# Patient Record
Sex: MALE | Race: WHITE | NOT HISPANIC OR LATINO | Employment: OTHER | URBAN - METROPOLITAN AREA
[De-identification: names, ages, dates, MRNs, and addresses within clinical notes are randomized per-mention and may not be internally consistent; named-entity substitution may affect disease eponyms.]

---

## 2017-04-11 ENCOUNTER — GENERIC CONVERSION - ENCOUNTER (OUTPATIENT)
Dept: OTHER | Facility: OTHER | Age: 52
End: 2017-04-11

## 2017-04-24 ENCOUNTER — GENERIC CONVERSION - ENCOUNTER (OUTPATIENT)
Dept: OTHER | Facility: OTHER | Age: 52
End: 2017-04-24

## 2017-05-05 ENCOUNTER — GENERIC CONVERSION - ENCOUNTER (OUTPATIENT)
Dept: OTHER | Facility: OTHER | Age: 52
End: 2017-05-05

## 2017-06-16 ENCOUNTER — ALLSCRIPTS OFFICE VISIT (OUTPATIENT)
Dept: OTHER | Facility: OTHER | Age: 52
End: 2017-06-16

## 2017-06-20 ENCOUNTER — GENERIC CONVERSION - ENCOUNTER (OUTPATIENT)
Dept: OTHER | Facility: OTHER | Age: 52
End: 2017-06-20

## 2017-07-14 ENCOUNTER — ALLSCRIPTS OFFICE VISIT (OUTPATIENT)
Dept: OTHER | Facility: OTHER | Age: 52
End: 2017-07-14

## 2017-10-11 ENCOUNTER — GENERIC CONVERSION - ENCOUNTER (OUTPATIENT)
Dept: OTHER | Facility: OTHER | Age: 52
End: 2017-10-11

## 2017-10-23 ENCOUNTER — ALLSCRIPTS OFFICE VISIT (OUTPATIENT)
Dept: OTHER | Facility: OTHER | Age: 52
End: 2017-10-23

## 2017-10-27 ENCOUNTER — GENERIC CONVERSION - ENCOUNTER (OUTPATIENT)
Dept: OTHER | Facility: OTHER | Age: 52
End: 2017-10-27

## 2017-12-01 DIAGNOSIS — D64.9 ANEMIA: ICD-10-CM

## 2017-12-01 DIAGNOSIS — E78.5 HYPERLIPIDEMIA: ICD-10-CM

## 2017-12-01 DIAGNOSIS — N40.0 ENLARGED PROSTATE WITHOUT LOWER URINARY TRACT SYMPTOMS (LUTS): ICD-10-CM

## 2017-12-01 DIAGNOSIS — Z12.5 ENCOUNTER FOR SCREENING FOR MALIGNANT NEOPLASM OF PROSTATE: ICD-10-CM

## 2017-12-01 DIAGNOSIS — E83.119 HEMOCHROMATOSIS: ICD-10-CM

## 2017-12-01 DIAGNOSIS — R53.83 OTHER FATIGUE: ICD-10-CM

## 2017-12-01 DIAGNOSIS — E11.9 TYPE 2 DIABETES MELLITUS WITHOUT COMPLICATIONS (HCC): ICD-10-CM

## 2017-12-18 ENCOUNTER — ALLSCRIPTS OFFICE VISIT (OUTPATIENT)
Dept: OTHER | Facility: OTHER | Age: 52
End: 2017-12-18

## 2017-12-19 ENCOUNTER — GENERIC CONVERSION - ENCOUNTER (OUTPATIENT)
Dept: OTHER | Facility: OTHER | Age: 52
End: 2017-12-19

## 2017-12-19 LAB
LEFT EYE DIABETIC RETINOPATHY: NORMAL
RIGHT EYE DIABETIC RETINOPATHY: NORMAL

## 2017-12-19 NOTE — PROGRESS NOTES
Assessment  1  Benign hypertension with CKD (chronic kidney disease) stage III (403 10,585 3) (I12 9,N18 3)   2  CKD (chronic kidney disease) stage 3, GFR 30-59 ml/min (585 3) (N18 3)   3  Hyperlipidemia (272 4) (E78 5)   4  Type 2 diabetes mellitus (250 00) (E11 9)    Plan  Anemia, Benign hypertension with CKD (chronic kidney disease) stage III, CKD (chronickidney disease) stage 3, GFR 30-59 ml/min, Hyperlipidemia, Type 2 diabetes mellitus    · (1) CBC/PLT/DIFF; Status:Active - Retrospective By Protocol Authorization; Requestedfor:04Jun2018;    · (1) TSH; Status:Active - Retrospective By Protocol Authorization; Requestedfor:04Jun2018; Benign hypertension with CKD (chronic kidney disease) stage III, CKD (chronic kidneydisease) stage 3, GFR 30-59 ml/min, Hyperlipidemia, Type 2 diabetes mellitus    · (1) COMPREHENSIVE METABOLIC PANEL; Status:Active - Retrospective By ProtocolAuthorization; Requested EMX:[id number];    · (1) HEMOGLOBIN A1C; Status:Active - Retrospective By Protocol Authorization; Requested SJJ:[id number];    · (1) LIPID PANEL, FASTING; Status:Active - Retrospective By Protocol Authorization; Requested OGI:[id number];    · (1) MICROALBUMIN CREATININE RATIO, RANDOM URINE; Status:Active - RetrospectiveBy Protocol Authorization; Requested FXI:[id number]; Discussion/Summary    Patient was seen in office today for follow-up of hypertension, diabetes, hyperlipidemia, CKD  Lab work was reviewed with the patient as well  1  HTN: Stable continue with current medication2  CKD: Stable, patient follows with Nephrology  Per Irvin Garcia there are no changes in the plan at this time  3  Hyperlipidemia:most recent lipid panel stable  Patient to continue atorvastatin 20 mg 4  DM: Controlled, most recent A1c decreased to 6 5%  Patient to continue with current medicationPatient is follow up in 6 months at that time will get urine microalbumin creatinine ratio, CMP, CBC, TSH, A1c, lipid panel     The patient was counseled regarding diagnostic results,-- instructions for management,-- risks and benefits of treatment options,-- importance of compliance with treatment  total time of encounter was 30 minutes-- and-- >50% minutes was spent counseling  Chief Complaint  F/U DM and cholesterol  ksd,cma      History of Present Illness  The patient is here today for a follow-up visit  His diabetes mellitus type 2 is stable  the patient is adherent with his medication regimen  -- He denies medication side effects  (Doesn't check sugar at home)  His hyperlipidemia has been stable  the patient is adherent with his medication regimen  -- He denies medication side effects  Symptoms: denies dyspnea,-- denies lower extremity edema,-- denies numbness of the feet,-- denies foot pain,-- denies a foot ulcer,-- denies muscle pain-- and-- denies muscle weakness--   The patient presents with complaints of mild sub-xiphoid new onset of chest pain, described as sharp (Patient had period of chest pain last week for one day on and off  No SOB or sweating associated with it  Patient said it wasn't reproducible  He went to the gym that day  It completely resolved and has not returned since  Patient educated to call the office if this does happen again  )  Associated symptoms include heartburn, but-- no palpitations-- and-- no syncope  Lifestyle and Disease Management: Diet: He consumes a diverse and healthy diet  Exercise: He exercises regularly  -- 4 x week  Smoking: He does not use tobacco  Alcohol: He denies alcohol use  -- rare  Goals: the patient is doing well with his goals  Patient presents for follow up of HL and DM and to review labs  Review of Systems   Constitutional: no fever-- and-- no chills  Eyes: no eyesight problems  Cardiovascular: chest pain-- and-- as stated above, but-- no palpitations-- and-- no extremity edema  Respiratory: no shortness of breath-- and-- no shortness of breath during exertion    Gastrointestinal: no nausea,-- no vomiting-- and-- no diarrhea  Genitourinary: no dysuria-- and-- no incontinence  Musculoskeletal: no myalgias  Integumentary: no rashes  Neurological: no headache,-- no numbness,-- no dizziness-- and-- no fainting  Active Problems  1  Anemia (285 9) (D64 9)   2  Benign essential hypertension (401 1) (I10)   3  Benign hypertension with CKD (chronic kidney disease) stage III (403 10,585 3) (I12 9,N18 3)   4  Benign prostate hyperplasia (600 00) (N40 0)   5  CKD (chronic kidney disease) stage 3, GFR 30-59 ml/min (585 3) (N18 3)   6  Encounter for prostate cancer screening (V76 44) (Z12 5)   7  Encounter for screening colonoscopy (V76 51) (Z12 11)   8  Fatigue (780 79) (R53 83)   9  Flu vaccine need (V04 81) (Z23)   10  Gastroesophageal reflux disease without esophagitis (530 81) (K21 9)   11  Hemochromatosis (275 03) (E83 119)   12  History of esophageal reflux (V12 79) (Z87 19)   13  Hyperlipidemia (272 4) (E78 5)   14  Need for influenza vaccination (V04 81) (Z23)   15  Pinguecula (372 51)   16  Tremor (781 0) (R25 1)   17  Type 2 diabetes mellitus (250 00) (E11 9)    Past Medical History  1  History of Acute diarrhea (787 91) (R19 7)   2  History of Acute kidney injury (nontraumatic) (584 9) (N17 9)   3  History of Chronic kidney disease, stage IV (severe) (585 4) (N18 4)   4  History of Cough (786 2) (R05)   5  History of Elevated ALT measurement (790 4) (R74 0)   6  History of Eosinophilic esophagitis (069 56) (K20 0)   7  History of Head injury, closed (959 01) (S09 90XA)   8  History of abnormal weight loss (V13 89) (Z87 898)   9  History of concussion (V15 52) (Z87 820)   10  History of dizziness (V13 89) (Z87 898)   11  History of hematuria (V13 09) (Z87 448)   12  History of nausea (V12 79) (Z87 898)   13  History of upper respiratory infection (V12 09) (Z87 09)   14  History of viral gastroenteritis (V12 09) (Z86 19)   15  History of Lower back pain (724 2) (M54 5)   16   History of Muscle weakness (728 87) (M62 81)   17  History of Pain of forearm (729 5) (M79 639)   18  History of Post concussion syndrome (310 2) (F07 81)   19  History of Radial head fracture (813 05) (S52 123A)   20  History of Wrist pain, right (719 43) (M25 531)    Surgical History  1  History of Biopsy Of Liver   2  History of Colonoscopy (Fiberoptic)   3  History of Diagnostic Esophagogastroduodenoscopy   4  History of Hernia Repair    Family History  Mother    1  Family history of Hyperlipidemia  Sister    2  Family history of Nodular Lymphoma    Social History   · Being A Social Drinker   · Denied: History of Drug Use   · Unknown If Ever Smoked    Current Meds   1  Atorvastatin Calcium 20 MG Oral Tablet; Take 1 tablet daily; Therapy: 61WYY7155 to (Last BR:27VWN3410)  Requested for: 26Jun2017 Ordered   2  ClonazePAM 0 5 MG Oral Tablet; Therapy: (Recorded:16Jun2017) to Recorded   3  Januvia 25 MG Oral Tablet; Take 1 tablet daily; Therapy: 27Gyp6583 to (Last QI:76WHW5021)  Requested for: 26Jun2017 Ordered   4  Losartan Potassium 25 MG Oral Tablet; TAKE ONE TABLET BY MOUTH EVERY DAY Recorded   5  OneTouch Test STRP; use twice a day as directed; Therapy: 87HGJ1596 to (Evaluate:47Btr5468)  Requested for: 70OXH7177; Last Rx:16Ziy5196 Ordered   6  OneTouch UltraSoft Lancets Miscellaneous; USE AS DIRECTED; Therapy: 28KDW6167 to (Evaluate:99Orh8622)  Requested for: 83DOJ3477; Last Rx:52Msw6461 Ordered   7  PriLOSEC 20 MG CPDR; Therapy: (Recorded:36Zip7669) to Recorded   8  Slow Magnesium/Calcium TBEC; Therapy: (Recorded:09Jun2015) to Recorded    Allergies  1  No Known Drug Allergies    Vitals  Vital Signs    Recorded: 81XEE2459 86:54HX   Systolic 631   Diastolic 82   Height 6 ft 2 in   Weight 211 lb 6 oz   BMI Calculated 27 14   BSA Calculated 2 22     Physical Exam   Constitutional  General appearance: Abnormal   overweight  Eyes  Conjunctiva and lids: No swelling, erythema, or discharge     Ears, Nose, Mouth, and Throat External inspection of ears and nose: Normal    Pulmonary  Respiratory effort: No increased work of breathing or signs of respiratory distress  Auscultation of lungs: Clear to auscultation, equal breath sounds bilaterally, no wheezes, no rales, no rhonci  Cardiovascular  Auscultation of heart: Normal rate and rhythm, normal S1 and S2, without murmurs  -- no edema  Results/Data  (1) HEMOGLOBIN A1C 41DMO1803 80:92QB [facility]     Test Name Result Flag Reference   HEMOGLOBIN A1C 6 5         Health Management  History of abnormal weight loss   COLONOSCOPY; every 3 years; Last 03LKE4292; Next Due: 44EWH5216;  Active    Future Appointments    Date/Time Provider Specialty Site   59/00/3106 98:31 PM [facility] DO Family Medicine [facility]     Signatures   Electronically signed by : Jonnie Woodard DO; Dec 18 2096  4:05PM EST                       (Author)

## 2018-01-10 NOTE — MISCELLANEOUS
Assessment   1  Viral gastroenteritis (008 8) (A08 4)1   2  Acute kidney injury (nontraumatic) (584 9) (N17 9)1   3  Type 2 diabetes mellitus (250 00) (E11 9)1   4  Gastroesophageal reflux disease without esophagitis (530 81) (K21 9)1   5  Benign hypertension with CKD (chronic kidney disease) stage III (403 10,585 3)   (I12 9,N18 3)1   6  CKD (chronic kidney disease) stage 3, GFR 30-59 ml/min (585 3) (N18 3)1      1 Amended By: Michoacano Nunez; Jul 14 8696 4:30 PM EST    Chief Complaint  Chief Complaint Free Text Note Form: TCM; c/o cold sxs x 1 day  Bibiana Avila        1 Amended By: John Paul Aceves; Jul 14 2017 2:42 PM EST    History of Present Illness  TCM Communication [facility]: The patient is being contacted for follow-up after hospitalization and [facility]  He was hospitalized at [facility]  The dates of hospitalization: 07/02/17-07/07/17  He was discharged to home  Medications reviewed and updated today  He scheduled a follow up appointment  The patient is currently asymptomatic  Counseling was provided to the patient  Communication performed and completed by naila ruiz      Active Problems   1  Anemia (285 9) (D64 9)  2  Benign essential hypertension (401 1) (I10)  3  Benign prostate hyperplasia (600 00) (N40 0)  4  CKD (chronic kidney disease) stage 3, GFR 30-59 ml/min (585 3) (N18 3)  5  Encounter for prostate cancer screening (V76 44) (Z12 5)  6  Encounter for screening colonoscopy (V76 51) (Z12 11)  7  Fatigue (780 79) (R53 83)  8  Hemochromatosis (275 03) (E83 119)  9  History of esophageal reflux (V12 79) (Z87 19)  10  Hyperlipidemia (272 4) (E78 5)  11  Need for influenza vaccination (V04 81) (Z23)  12  Pinguecula (372 51)  13  Tremor (781 0) (R25 1)  14  Type 2 diabetes mellitus (250 00) (E11 9)    Past Medical History   1  History of Acute diarrhea (787 91) (R19 7)  2  History of Chronic kidney disease, stage IV (severe) (585 4) (N18 4)  3  History of Cough (786 2) (R05)  4   History of Elevated ALT measurement (790 4) (R74 0)  5  History of Eosinophilic esophagitis (155 71) (K20 0)  6  History of Head injury, closed (959 01) (S09 90XA)  7  History of abnormal weight loss (V13 89) (Z87 898)  8  History of concussion (V15 52) (Z87 820)  9  History of dizziness (V13 89) (Z87 898)  10  History of hematuria (V13 09) (Z87 448)  11  History of nausea (V12 79) (Z87 898)  12  History of upper respiratory infection (V12 09) (Z87 09)  13  History of viral gastroenteritis (V12 09) (Z86 19)  14  History of Lower back pain (724 2) (M54 5)  15  History of Muscle weakness (728 87) (M62 81)  16  History of Pain of forearm (729 5) (M79 639)  17  History of Post concussion syndrome (310 2) (F07 81)  18  History of Radial head fracture (813 05) (S52 123A)  19  History of Wrist pain, right (719 43) (M25 531)    Surgical History   1  History of Biopsy Of Liver  2  History of Colonoscopy (Fiberoptic)  3  History of Diagnostic Esophagogastroduodenoscopy  4  History of Hernia Repair    Family History  Mother   1  Family history of Hyperlipidemia  Sister   2  Family history of Nodular Lymphoma    Social History    · Being A Social Drinker   · Denied: History of Drug Use   · Unknown If Ever Smoked    Current Meds  1  Atorvastatin Calcium 20 MG Oral Tablet; Take 1 tablet daily; Therapy: 91RHL6794 to (Last TY:51MKC9101)  Requested for: 26Jun2017 Ordered  2  ClonazePAM 0 5 MG Oral Tablet; Therapy: (Recorded:16Jun2017) to Recorded  3  Januvia 25 MG Oral Tablet; Take 1 tablet daily; Therapy: 76Oel4087 to (Last MT:83FGQ7966)  Requested for: 26Jun2017 Ordered  4  Losartan Potassium 25 MG Oral Tablet; TAKE ONE TABLET BY MOUTH EVERY DAY   Recorded  5  OneTouch Test STRP; use twice a day as directed; Therapy: 45IKP7311 to (Evaluate:03Ozo6026)  Requested for: 60MRI8392; Last   Rx:25Vdd7700 Ordered  6  OneTouch UltraSoft Lancets Miscellaneous; USE AS DIRECTED;    Therapy: 47CLV0743 to (Evaluate:98Ezf9440)  Requested for: 60FCY6106; Last   Rx:11Jul2012 Ordered  7  PriLOSEC 20 MG CPDR; Therapy: (Recorded:89Oia1955) to Recorded  8  Slow Magnesium/Calcium TBEC; Therapy: (OCNQACVB:45MWD5843) to Recorded  9  Zemplar 1 MCG Oral Capsule; Therapy: (Recorded:14Rpy0177) to Recorded    Allergies   1  No Known Drug Allergies    Health Management  History of abnormal weight loss   COLONOSCOPY; every 3 years; Last 52KKB3352; Next Due: 14MIA8683;  Active    Future Appointments    Date/Time Provider Specialty Site   68/42/2694 23:67 PM Perri Reis DO Family Medicine [facility]   10/23/2017 01:00 PM [facility] Nurse Schedule  [facility]     Signatures   Electronically signed by : Tanna Segundo DO; Jul 7 2017 11:12AM EST                       (Author)    Electronically signed by : Yris Montes DO; Jul 14 6689  4:30PM EST                       (Author)

## 2018-01-10 NOTE — PROGRESS NOTES
Chief Complaint  Pt here for flu shot, given IM left deltoid  Active Problems    1  Anemia (285 9) (D64 9)   2  Benign hypertension with CKD (chronic kidney disease) stage III (403 10,585 3)   (I12 9,N18 3)   3  Benign prostate hyperplasia (600 00) (N40 0)   4  Chronic kidney disease, stage IV (severe) (585 4) (N18 4)   5  Elevated ALT measurement (790 4) (R74 0)   6  Encounter for screening colonoscopy (V76 51) (Z12 11)   7  Fatigue (780 79) (R53 83)   8  Hemochromatosis (275 03) (E83 119)   9  History of esophageal reflux (V12 79) (Z87 19)   10  Hyperlipidemia (272 4) (E78 5)   11  Need for influenza vaccination (V04 81) (Z23)   12  Pinguecula (372 51)   13  Tremor (781 0) (R25 1)   14  Type 2 diabetes mellitus (250 00) (E11 9)    Current Meds   1  Atorvastatin Calcium 20 MG Oral Tablet; take 1 tablet by mouth one time daily; Therapy: 22CZJ7339 to (Evaluate:01Apr2017)  Requested for: 79WUG9458; Last   Rx:06Apr2016 Ordered   2  Januvia 25 MG Oral Tablet; TAKE ONE TABLET BY MOUTH ONCE DAILY; Therapy: 30Xwa1515 to (Evaluate:01Apr2017)  Requested for: 28UXI9876; Last   Rx:06Apr2016 Ordered   3  Losartan Potassium 25 MG Oral Tablet; TAKE ONE TABLET BY MOUTH EVERY DAY   Recorded   4  OneTouch Test STRP; use twice a day as directed; Therapy: 89TEQ0257 to (Evaluate:10Tmk0951)  Requested for: 04PTI3748; Last   Rx:23Mqn0457 Ordered   5  OneTouch UltraSoft Lancets Miscellaneous; USE AS DIRECTED; Therapy: 16GOD6878 to (Evaluate:13Emk1123)  Requested for: 52XIV9481; Last   Rx:48Kzd5196 Ordered   6  PriLOSEC 20 MG Oral Capsule Delayed Release; Therapy: (Recorded:19Iwv0197) to Recorded   7  Slow Magnesium/Calcium TBEC; Therapy: (FUCLXTWN:73CUA7187) to Recorded   8  Zemplar 1 MCG Oral Capsule; Therapy: (Recorded:36Weh9637) to Recorded    Allergies    1   No Known Drug Allergies    Plan  Need for influenza vaccination    · Fluzone Quadrivalent Intramuscular Suspension    Future Appointments    Date/Time Provider Specialty Site   60/99/4572 63:71 PM Cassy DO [facility]     Signatures   Electronically signed by : Tabatha Masterson DO; Oct  4 8942  7:33PM EST                       (Author)

## 2018-01-11 ENCOUNTER — LAB CONVERSION - ENCOUNTER (OUTPATIENT)
Dept: OTHER | Facility: OTHER | Age: 53
End: 2018-01-11

## 2018-01-11 LAB — FECAL GLOBIN BY IMMUNOCHEM (HISTORICAL): NORMAL

## 2018-01-13 VITALS
DIASTOLIC BLOOD PRESSURE: 70 MMHG | WEIGHT: 209.25 LBS | BODY MASS INDEX: 26.85 KG/M2 | SYSTOLIC BLOOD PRESSURE: 122 MMHG | HEIGHT: 74 IN

## 2018-01-14 VITALS
DIASTOLIC BLOOD PRESSURE: 62 MMHG | BODY MASS INDEX: 26.5 KG/M2 | SYSTOLIC BLOOD PRESSURE: 118 MMHG | WEIGHT: 206.5 LBS | HEIGHT: 74 IN

## 2018-01-17 NOTE — PROGRESS NOTES
Chief Complaint  Pt presented for influenza vaccine; administered without incident and tolerated well  ksd,cma      Active Problems    1  Acute kidney injury (nontraumatic) (584 9) (N17 9)   2  Anemia (285 9) (D64 9)   3  Benign essential hypertension (401 1) (I10)   4  Benign hypertension with CKD (chronic kidney disease) stage III (403 10,585 3)   (I12 9,N18 3)   5  Benign prostate hyperplasia (600 00) (N40 0)   6  CKD (chronic kidney disease) stage 3, GFR 30-59 ml/min (585 3) (N18 3)   7  Encounter for prostate cancer screening (V76 44) (Z12 5)   8  Encounter for screening colonoscopy (V76 51) (Z12 11)   9  Fatigue (780 79) (R53 83)   10  Gastroesophageal reflux disease without esophagitis (530 81) (K21 9)   11  Hemochromatosis (275 03) (E83 119)   12  History of esophageal reflux (V12 79) (Z87 19)   13  Hyperlipidemia (272 4) (E78 5)   14  Need for influenza vaccination (V04 81) (Z23)   15  Pinguecula (372 51)   16  Tremor (781 0) (R25 1)   17  Type 2 diabetes mellitus (250 00) (E11 9)   18  Viral gastroenteritis (008 8) (A08 4)    Current Meds   1  Atorvastatin Calcium 20 MG Oral Tablet; Take 1 tablet daily; Therapy: 06YBQ8388 to (Last FB:12KBE3264)  Requested for: 26Jun2017 Ordered   2  ClonazePAM 0 5 MG Oral Tablet; Therapy: (Recorded:16Jun2017) to Recorded   3  Januvia 25 MG Oral Tablet; Take 1 tablet daily; Therapy: 94Rub8441 to (Last XV:53EZU3979)  Requested for: 26Jun2017 Ordered   4  Losartan Potassium 25 MG Oral Tablet; TAKE ONE TABLET BY MOUTH EVERY DAY   Recorded   5  OneTouch Test STRP; use twice a day as directed; Therapy: 59NKV1461 to (Evaluate:51Nxx7135)  Requested for: 89IWP3733; Last   Rx:80Yec3186 Ordered   6  OneTouch UltraSoft Lancets Miscellaneous; USE AS DIRECTED; Therapy: 18KJZ0803 to (Evaluate:37Wfm5618)  Requested for: 66STU1215; Last   Rx:87Xdt8708 Ordered   7  PriLOSEC 20 MG CPDR; Therapy: (Recorded:10Jul2012) to Recorded   8  Slow Magnesium/Calcium TBEC;    Therapy: (FKZVZYYU:12LQQ6128) to Recorded   9  Zemplar 1 MCG Oral Capsule; Therapy: (Recorded:98Qjo3395) to Recorded    Allergies    1   No Known Drug Allergies    Plan  Flu vaccine need    · Fluzone Quadrivalent Intramuscular Suspension    Future Appointments    Date/Time Provider Specialty Site   85/09/7140 47:09 PM Doris Germain,  Family Medicine [facility]     Signatures   Electronically signed by : Jez Epps DO; Oct 23 5101  1:14PM EST                       (Author)

## 2018-01-23 VITALS
HEIGHT: 74 IN | BODY MASS INDEX: 27.13 KG/M2 | SYSTOLIC BLOOD PRESSURE: 112 MMHG | DIASTOLIC BLOOD PRESSURE: 82 MMHG | WEIGHT: 211.38 LBS

## 2018-02-05 ENCOUNTER — TELEPHONE (OUTPATIENT)
Dept: FAMILY MEDICINE CLINIC | Facility: CLINIC | Age: 53
End: 2018-02-05

## 2018-02-05 ENCOUNTER — TRANSITIONAL CARE MANAGEMENT (OUTPATIENT)
Dept: FAMILY MEDICINE CLINIC | Facility: CLINIC | Age: 53
End: 2018-02-05

## 2018-02-05 NOTE — TELEPHONE ENCOUNTER
Patient called and scheduled a LISANDRA appt with Dr Nathanael De La Rosa on 02/07/2018, can you please call patient to do the [facility] note    Cb# [phone]

## 2018-02-08 ENCOUNTER — OFFICE VISIT (OUTPATIENT)
Dept: FAMILY MEDICINE CLINIC | Facility: CLINIC | Age: 53
End: 2018-02-08
Payer: COMMERCIAL

## 2018-02-08 VITALS
HEIGHT: 72 IN | WEIGHT: 207.6 LBS | BODY MASS INDEX: 28.12 KG/M2 | DIASTOLIC BLOOD PRESSURE: 68 MMHG | SYSTOLIC BLOOD PRESSURE: 104 MMHG

## 2018-02-08 DIAGNOSIS — N18.30 STAGE 3 CHRONIC KIDNEY DISEASE (HCC): ICD-10-CM

## 2018-02-08 DIAGNOSIS — R74.8 ELEVATED LIVER ENZYMES: ICD-10-CM

## 2018-02-08 DIAGNOSIS — K80.42 CHOLEDOCHOLITHIASIS WITH ACUTE CHOLECYSTITIS: Primary | ICD-10-CM

## 2018-02-08 DIAGNOSIS — E11.9 TYPE 2 DIABETES MELLITUS WITHOUT COMPLICATION, WITHOUT LONG-TERM CURRENT USE OF INSULIN (HCC): ICD-10-CM

## 2018-02-08 PROCEDURE — 3066F NEPHROPATHY DOC TX: CPT | Performed by: FAMILY MEDICINE

## 2018-02-08 PROCEDURE — 99213 OFFICE O/P EST LOW 20 MIN: CPT | Performed by: FAMILY MEDICINE

## 2018-02-09 PROBLEM — N18.30 STAGE 3 CHRONIC KIDNEY DISEASE (HCC): Status: ACTIVE | Noted: 2018-02-09

## 2018-02-09 PROBLEM — E11.9 TYPE 2 DIABETES MELLITUS WITHOUT COMPLICATION (HCC): Status: ACTIVE | Noted: 2018-02-09

## 2018-02-09 PROBLEM — K80.42 CHOLEDOCHOLITHIASIS WITH ACUTE CHOLECYSTITIS: Status: ACTIVE | Noted: 2018-02-09

## 2018-02-09 PROBLEM — R74.8 ELEVATED LIVER ENZYMES: Status: ACTIVE | Noted: 2018-02-09

## 2018-02-09 NOTE — PROGRESS NOTES
Assessment/Plan:    Choledocholithiasis with acute cholecystitis  Patient's hospitalization has been reviewed from emergency room to discharge  Patient did undergo ERCP and then eventual cholecystectomy  He will follow up with [address] [place] [address] surgery  Overall he looks very good after being operated on just a few days ago  He is resuming his diet as best as possible  Type 2 diabetes mellitus without complication (HCC)  Patient's diabetes is stable    Stage 3 chronic kidney disease  Patient's kidney status is stable    Elevated liver enzymes  Patient's liver enzymes have improved prior to discharge  Patient will continue to follow up with Gastroenterology at [facility]  His previous liver specialist is no longer in the area and only working down in [place] fairly  Add EP GI I have given him the name of Dr Landry as he is their liver specialist       There are no diagnoses linked to this encounter  There are no Patient Instructions on file for this visit  Subjective:        Patient ID: Maurisio Chavarria is a 47 y o  male  Chief Complaint   Patient presents with    Transition of Care Management       58-year-old white male here status post hospitalization at Hospital   Initially went in on January 30 with chest pain like symptoms but was found to have acute cholecystitis  An ERCP was performed and then eventually patient had his gallbladder removed  He was discharged home on February 3rd  Liver enzymes initially were elevated but improved before discharge  Overall he is feeling well  He has surgical follow-up  Today he is here with his mother  Review of Systems   Constitutional: Negative for appetite change, fatigue, fever and unexpected weight change  HENT: Negative for congestion, ear pain, postnasal drip, rhinorrhea, sinus pain, sinus pressure and sore throat  Eyes: Negative for redness and visual disturbance  Respiratory: Negative for chest tightness and shortness of breath  Cardiovascular: Negative for chest pain, palpitations and leg swelling  Gastrointestinal: Negative for abdominal distention, abdominal pain, diarrhea and nausea  Endocrine: Negative for cold intolerance and heat intolerance  Genitourinary: Negative for dysuria and hematuria  Musculoskeletal: Negative for arthralgias, gait problem and myalgias  Skin: Negative for pallor and rash  Neurological: Negative for dizziness and headaches  Psychiatric/Behavioral: Negative for behavioral problems  The patient is not nervous/anxious  Objective:  /68 (BP Location: Left arm, Patient Position: Sitting, Cuff Size: Standard)   Ht 6' (1 829 m)   Wt 94 2 kg (207 lb 9 6 oz)   BMI 28 16 kg/m²        Physical Exam   Constitutional: He is oriented to person, place, and time  He appears well-developed and well-nourished  No distress  HENT:   Head: Normocephalic and atraumatic  Right Ear: External ear normal    Left Ear: External ear normal    Mouth/Throat: Oropharynx is clear and moist    Eyes: Conjunctivae and EOM are normal  Pupils are equal, round, and reactive to light  No scleral icterus  Neck: Normal range of motion  Neck supple  No thyromegaly present  Cardiovascular: Normal rate, regular rhythm and intact distal pulses  No murmur heard  Pulmonary/Chest: Effort normal and breath sounds normal  He has no wheezes  Abdominal: Soft  Bowel sounds are normal  He exhibits no distension  There is no tenderness  Musculoskeletal: Normal range of motion  Lymphadenopathy:     He has no cervical adenopathy  Neurological: He is alert and oriented to person, place, and time  He displays normal reflexes  Coordination normal    Skin: Skin is warm  No pallor  Patient's incisions look clean without drainage or redness   Psychiatric: He has a normal mood and affect   His behavior is normal  Thought content normal

## 2018-02-09 NOTE — ASSESSMENT & PLAN NOTE
Patient's hospitalization has been reviewed from emergency room to discharge  Patient did undergo ERCP and then eventual cholecystectomy  He will follow up with [facility] surgery  Overall he looks very good after being operated on just a few days ago  He is resuming his diet as best as possible

## 2018-02-09 NOTE — ASSESSMENT & PLAN NOTE
Patient's liver enzymes have improved prior to discharge  Patient will continue to follow up with Gastroenterology at  [facility]  His previous liver specialist is no longer in the area and only working down in [facility] [place]    Add  GI I have given him the name of Dr Landry as he is their liver specialist

## 2018-02-20 DIAGNOSIS — E78.00 PURE HYPERCHOLESTEROLEMIA: Primary | ICD-10-CM

## 2018-02-20 RX ORDER — ATORVASTATIN CALCIUM 20 MG/1
TABLET, FILM COATED ORAL
Qty: 90 TABLET | Refills: 2 | Status: SHIPPED | OUTPATIENT
Start: 2018-02-20 | End: 2018-12-06 | Stop reason: SDUPTHER

## 2018-03-07 DIAGNOSIS — E11.9 TYPE 2 DIABETES MELLITUS WITHOUT COMPLICATION, WITHOUT LONG-TERM CURRENT USE OF INSULIN (HCC): Primary | ICD-10-CM

## 2018-03-08 RX ORDER — SITAGLIPTIN 25 MG/1
TABLET, FILM COATED ORAL
Qty: 90 TABLET | Refills: 2 | Status: SHIPPED | OUTPATIENT
Start: 2018-03-08 | End: 2018-12-06 | Stop reason: SDUPTHER

## 2018-04-05 ENCOUNTER — TELEPHONE (OUTPATIENT)
Dept: FAMILY MEDICINE CLINIC | Facility: CLINIC | Age: 53
End: 2018-04-05

## 2018-04-05 NOTE — TELEPHONE ENCOUNTER
On 12/4/17 patient had a screening PSA, received bill from [facility]  His insurance ([employer]) does not cover screening PSA's however they will cover diagnostic PSA's, can we resubmit using a diagnostic code  [facility] - [phone], patient#:  [id number]  What diagnosis code should we use to resubmit?

## 2018-05-14 ENCOUNTER — TELEPHONE (OUTPATIENT)
Dept: FAMILY MEDICINE CLINIC | Facility: CLINIC | Age: 53
End: 2018-05-14

## 2018-05-14 NOTE — TELEPHONE ENCOUNTER
Having problems, when he eats sometimes it feels like he can't swallow, has a choking sensation  He did call and schedule an appointment with [facility] but not able to get him for another 2 months as they don't feel this is urgent  Asking for advice

## 2018-05-14 NOTE — TELEPHONE ENCOUNTER
Can we call [facility] to get appt to move up  In meantime eat softer foods, liquids   If [facility] declines then ask if he minds switching to [facility]

## 2018-05-15 NOTE — TELEPHONE ENCOUNTER
Spoke to [facility], the first available appointment is 7/11/18 with any physician or provider  Will speak to the patient

## 2018-06-04 DIAGNOSIS — D64.9 ANEMIA: ICD-10-CM

## 2018-06-04 DIAGNOSIS — N18.30 CHRONIC KIDNEY DISEASE, STAGE III (MODERATE) (HCC): ICD-10-CM

## 2018-06-04 DIAGNOSIS — E11.9 TYPE 2 DIABETES MELLITUS WITHOUT COMPLICATIONS (HCC): ICD-10-CM

## 2018-06-04 DIAGNOSIS — E78.5 HYPERLIPIDEMIA: ICD-10-CM

## 2018-06-04 DIAGNOSIS — I12.9 HYPERTENSIVE CHRONIC KIDNEY DISEASE WITH STAGE 1 THROUGH STAGE 4 CHRONIC KIDNEY DISEASE, OR UNSPECIFIED CHRONIC KIDNEY DISEASE: ICD-10-CM

## 2018-06-06 LAB
CREAT ?TM UR-SCNC: 164 UMOL/L
CREAT ?TM UR-SCNC: 164 UMOL/L
HBA1C MFR BLD HPLC: 6.6 %
MICROALBUMIN UR-MCNC: 3 MG/L (ref 0–20)
MICROALBUMIN UR-MCNC: 3 MG/L (ref 0–20)
MICROALBUMIN/CREAT UR: 18 MG/G{CREAT}
MICROALBUMIN/CREAT UR: 18 MG/G{CREAT}

## 2018-06-06 PROCEDURE — 3061F NEG MICROALBUMINURIA REV: CPT | Performed by: FAMILY MEDICINE

## 2018-06-19 RX ORDER — PROPRANOLOL HCL 60 MG
CAPSULE, EXTENDED RELEASE 24HR ORAL
COMMUNITY
Start: 2018-06-02

## 2018-06-20 ENCOUNTER — OFFICE VISIT (OUTPATIENT)
Dept: FAMILY MEDICINE CLINIC | Facility: CLINIC | Age: 53
End: 2018-06-20

## 2018-06-20 VITALS
HEIGHT: 72 IN | WEIGHT: 206.8 LBS | BODY MASS INDEX: 28.01 KG/M2 | DIASTOLIC BLOOD PRESSURE: 90 MMHG | SYSTOLIC BLOOD PRESSURE: 112 MMHG

## 2018-06-20 DIAGNOSIS — Z23 NEED FOR TETANUS, DIPHTHERIA, AND ACELLULAR PERTUSSIS (TDAP) VACCINE IN PATIENT OF ADOLESCENT AGE OR OLDER: ICD-10-CM

## 2018-06-20 DIAGNOSIS — N18.30 CKD (CHRONIC KIDNEY DISEASE) STAGE 3, GFR 30-59 ML/MIN (HCC): ICD-10-CM

## 2018-06-20 DIAGNOSIS — E78.2 MIXED HYPERLIPIDEMIA: ICD-10-CM

## 2018-06-20 DIAGNOSIS — Z12.11 SCREENING FOR MALIGNANT NEOPLASM OF COLON: ICD-10-CM

## 2018-06-20 DIAGNOSIS — E11.9 TYPE 2 DIABETES MELLITUS WITHOUT COMPLICATION, WITHOUT LONG-TERM CURRENT USE OF INSULIN (HCC): Primary | ICD-10-CM

## 2018-06-20 DIAGNOSIS — K21.9 GASTROESOPHAGEAL REFLUX DISEASE WITHOUT ESOPHAGITIS: ICD-10-CM

## 2018-06-20 PROBLEM — K80.42 CHOLEDOCHOLITHIASIS WITH ACUTE CHOLECYSTITIS: Status: RESOLVED | Noted: 2018-02-09 | Resolved: 2018-06-20

## 2018-06-20 PROBLEM — E80.6 HYPERBILIRUBINEMIA: Status: ACTIVE | Noted: 2018-01-31

## 2018-06-20 PROCEDURE — 99214 OFFICE O/P EST MOD 30 MIN: CPT | Performed by: FAMILY MEDICINE

## 2018-06-21 NOTE — PROGRESS NOTES
Assessment/Plan:    Patient's blood pressure is slightly up today diastolic weight but overall his home blood pressures are quite good  Patient's A1c is at goal   Patient's GFR is quite stable  LDL on cholesterol medication is less than 100  GERD is stable  Patient very seen in 6 months with follow-up labs  Continues to follow up with his liver specialist and nephrologist   Recommend flu shot in the fall  Diagnoses and all orders for this visit:    Type 2 diabetes mellitus without complication, without long-term current use of insulin (HCC)  -     Hemoglobin A1C; Future    CKD (chronic kidney disease) stage 3, GFR 30-59 ml/min  -     Comprehensive metabolic panel; Future    Mixed hyperlipidemia  -     Lipid panel; Future    Gastroesophageal reflux disease without esophagitis    Need for tetanus, diphtheria, and acellular pertussis (Tdap) vaccine in patient of adolescent age or older    Screening for malignant neoplasm of colon    Other orders  -     propranolol (INDERAL LA) 60 mg 24 hr capsule;   -     Cancel: TDAP VACCINE GREATER THAN OR EQUAL TO 8YO IM  -     Cancel: Ambulatory referral to Gastroenterology; Future        There are no Patient Instructions on file for this visit  Subjective:        Patient ID: Mike Velazquez is a 47 y o  male  Chief Complaint   Patient presents with    Follow-up     review labs, discuss crc       Patient here for 6 month check regarding blood pressure and diabetes  Overall doing well  No new concerns  The following portions of the patient's history were reviewed and updated as appropriate: past medical history, past surgical history and problem list       Review of Systems      Objective:  /90 (BP Location: Left arm, Patient Position: Sitting, Cuff Size: Standard)   Ht 6' (1 829 m)   Wt 93 8 kg (206 lb 12 8 oz)   BMI 28 05 kg/m²        Physical Exam   Constitutional: He is oriented to person, place, and time  He appears well-nourished  No distress  HENT:   Head: Normocephalic and atraumatic  Mouth/Throat: Oropharynx is clear and moist    Eyes: Conjunctivae and EOM are normal  Pupils are equal, round, and reactive to light  No scleral icterus  Neck: Normal range of motion  Neck supple  No thyromegaly present  Cardiovascular: Normal rate, regular rhythm and intact distal pulses  No murmur heard  Pulmonary/Chest: Effort normal and breath sounds normal  He has no wheezes  Abdominal: Soft  Bowel sounds are normal  He exhibits no distension  There is no tenderness  Musculoskeletal: Normal range of motion  He exhibits no edema  Lymphadenopathy:     He has no cervical adenopathy  Neurological: He is alert and oriented to person, place, and time  Coordination normal    Skin: Skin is warm  No pallor  Psychiatric: He has a normal mood and affect   His behavior is normal  Thought content normal

## 2018-12-06 DIAGNOSIS — E78.00 PURE HYPERCHOLESTEROLEMIA: ICD-10-CM

## 2018-12-06 DIAGNOSIS — E11.9 TYPE 2 DIABETES MELLITUS WITHOUT COMPLICATION, WITHOUT LONG-TERM CURRENT USE OF INSULIN (HCC): ICD-10-CM

## 2018-12-06 RX ORDER — SITAGLIPTIN 25 MG/1
TABLET, FILM COATED ORAL
Qty: 90 TABLET | Refills: 2 | Status: SHIPPED | OUTPATIENT
Start: 2018-12-06 | End: 2019-09-10 | Stop reason: SDUPTHER

## 2018-12-06 RX ORDER — ATORVASTATIN CALCIUM 20 MG/1
TABLET, FILM COATED ORAL
Qty: 90 TABLET | Refills: 2 | Status: SHIPPED | OUTPATIENT
Start: 2018-12-06 | End: 2019-09-10 | Stop reason: SDUPTHER

## 2019-01-02 LAB — HBA1C MFR BLD HPLC: 6.9 %

## 2019-01-09 ENCOUNTER — OFFICE VISIT (OUTPATIENT)
Dept: FAMILY MEDICINE CLINIC | Facility: CLINIC | Age: 54
End: 2019-01-09

## 2019-01-09 VITALS
BODY MASS INDEX: 26.46 KG/M2 | DIASTOLIC BLOOD PRESSURE: 80 MMHG | HEIGHT: 74 IN | WEIGHT: 206.2 LBS | SYSTOLIC BLOOD PRESSURE: 112 MMHG

## 2019-01-09 DIAGNOSIS — E11.9 TYPE 2 DIABETES MELLITUS WITHOUT COMPLICATION, WITHOUT LONG-TERM CURRENT USE OF INSULIN (HCC): ICD-10-CM

## 2019-01-09 DIAGNOSIS — E83.119 HEMOCHROMATOSIS, UNSPECIFIED HEMOCHROMATOSIS TYPE: ICD-10-CM

## 2019-01-09 DIAGNOSIS — Z23 ENCOUNTER FOR IMMUNIZATION: ICD-10-CM

## 2019-01-09 DIAGNOSIS — Z12.5 ENCOUNTER FOR SCREENING FOR MALIGNANT NEOPLASM OF PROSTATE: ICD-10-CM

## 2019-01-09 DIAGNOSIS — E78.2 MIXED HYPERLIPIDEMIA: ICD-10-CM

## 2019-01-09 DIAGNOSIS — N18.30 CKD (CHRONIC KIDNEY DISEASE) STAGE 3, GFR 30-59 ML/MIN (HCC): Primary | ICD-10-CM

## 2019-01-09 PROCEDURE — 99214 OFFICE O/P EST MOD 30 MIN: CPT | Performed by: FAMILY MEDICINE

## 2019-01-09 RX ORDER — DIPHENOXYLATE HYDROCHLORIDE AND ATROPINE SULFATE 2.5; .025 MG/1; MG/1
1 TABLET ORAL
COMMUNITY

## 2019-01-11 NOTE — PROGRESS NOTES
Assessment/Plan:    Patient for 6 month check regarding labs and blood pressure  Overall doing well  Mentions recently he felt a little unusual at night he did not have chills but he felt somewhat shaky  He was not quite sure to do an is looking for advice  I discussed with him that this could possibly be a low sugar and  that he should automatically either drink OJ or have something to either drink w sugar or eat  Dietary measures and alcohol as reviewed  He needs to keep track of this to see if it is happening more often or not  A1c is at goal   LDL cholesterol is at goal   Will recheck in 6 months with labs to include CBC and iron studies regarding his past history of hemochromatosis  He has not been able to see his old liver specialist   I recommend that he consider seeing the liver specialist where he also sees his GI doctors  Flu up-to-date  Diagnoses and all orders for this visit:    CKD (chronic kidney disease) stage 3, GFR 30-59 ml/min (HCC)  -     Comprehensive metabolic panel; Future    Type 2 diabetes mellitus without complication, without long-term current use of insulin (HCC)  -     HEMOGLOBIN A1C W/ EAG ESTIMATION; Future  -     Microalbumin / creatinine urine ratio    Mixed hyperlipidemia  -     Lipid panel; Future  -     TSH, 3rd generation; Future    Hemochromatosis, unspecified hemochromatosis type  -     CBC and differential; Future  -     Ferritin; Future  -     Iron Saturation %; Future  -     Iron; Future  -     TIBC; Future    Encounter for screening for malignant neoplasm of prostate  -     PSA, Total Screen; Future    Encounter for immunization  -     Cancel: TDAP VACCINE GREATER THAN OR EQUAL TO 6YO IM        1  CKD (chronic kidney disease) stage 3, GFR 30-59 ml/min (HCC)  Comprehensive metabolic panel   2   Type 2 diabetes mellitus without complication, without long-term current use of insulin (HCC)  HEMOGLOBIN A1C W/ EAG ESTIMATION    Microalbumin / creatinine urine ratio 3  Mixed hyperlipidemia  Lipid panel    TSH, 3rd generation   4  Hemochromatosis, unspecified hemochromatosis type  CBC and differential    Ferritin    Iron Saturation %    Iron    TIBC   5  Encounter for screening for malignant neoplasm of prostate  PSA, Total Screen   6  Encounter for immunization  CANCELED: TDAP VACCINE GREATER THAN OR EQUAL TO 6YO IM       Subjective:        Patient ID: Natalie Mariano is a 46 y o  male  Chief Complaint   Patient presents with    Follow-up     7 months; Monday night pt states that he was not feeling, pt states that 3 in the morning he was shaking last for a few hours and then yesterday he did not feel well   Immunizations     discuss T-dap vaccine       Patient for 6 month check regarding labs and blood pressure  Overall feels well  Mention recent episode where he woke up in the night without chills or sweats but felt shaky  Mentions he had an extra be or so the night before        The following portions of the patient's history were reviewed and updated as appropriate: past medical history, past surgical history and problem list       Review of Systems   Constitutional: Negative for appetite change, fatigue, fever and unexpected weight change  HENT: Negative for congestion, ear pain, postnasal drip, rhinorrhea, sinus pain, sinus pressure and sore throat  Eyes: Negative for redness and visual disturbance  Respiratory: Negative for chest tightness and shortness of breath  Cardiovascular: Negative for chest pain, palpitations and leg swelling  Gastrointestinal: Negative for abdominal distention, abdominal pain, diarrhea and nausea  Endocrine: Negative for cold intolerance and heat intolerance  Genitourinary: Negative for dysuria and hematuria  Musculoskeletal: Negative for arthralgias, gait problem and myalgias  Skin: Negative for pallor and rash  Neurological: Negative for dizziness and headaches     Psychiatric/Behavioral: Negative for behavioral problems  The patient is not nervous/anxious  Objective:  /80 (BP Location: Left arm, Patient Position: Sitting, Cuff Size: Standard)   Ht 6' 2" (1 88 m)   Wt 93 5 kg (206 lb 3 2 oz)   BMI 26 47 kg/m²        Physical Exam   Constitutional: He is oriented to person, place, and time  He appears well-nourished  No distress  HENT:   Head: Normocephalic and atraumatic  Right Ear: Tympanic membrane normal    Left Ear: Tympanic membrane normal    Nose: Nose normal    Mouth/Throat: Oropharynx is clear and moist    Eyes: Pupils are equal, round, and reactive to light  Conjunctivae and EOM are normal  No scleral icterus  Neck: Normal range of motion  Neck supple  No thyromegaly present  Cardiovascular: Normal rate, regular rhythm and intact distal pulses  No murmur heard  Pulses:       Carotid pulses are 0 on the right side, and 0 on the left side  Pulmonary/Chest: Effort normal and breath sounds normal  He has no wheezes  Abdominal: Soft  He exhibits no distension  There is no tenderness  Musculoskeletal: Normal range of motion  He exhibits no edema  Lymphadenopathy:     He has no cervical adenopathy  Neurological: He is alert and oriented to person, place, and time  No cranial nerve deficit  Coordination normal    Skin: Skin is warm  No pallor  Psychiatric: He has a normal mood and affect  His behavior is normal  Thought content normal    Nursing note and vitals reviewed

## 2019-03-27 ENCOUNTER — OFFICE VISIT (OUTPATIENT)
Dept: FAMILY MEDICINE CLINIC | Facility: CLINIC | Age: 54
End: 2019-03-27
Payer: COMMERCIAL

## 2019-03-27 VITALS
DIASTOLIC BLOOD PRESSURE: 70 MMHG | WEIGHT: 201.2 LBS | HEIGHT: 74 IN | BODY MASS INDEX: 25.82 KG/M2 | SYSTOLIC BLOOD PRESSURE: 102 MMHG

## 2019-03-27 DIAGNOSIS — E11.9 TYPE 2 DIABETES MELLITUS WITHOUT COMPLICATION, WITHOUT LONG-TERM CURRENT USE OF INSULIN (HCC): ICD-10-CM

## 2019-03-27 DIAGNOSIS — K80.51 CALCULUS OF BILE DUCT WITHOUT CHOLECYSTITIS WITH OBSTRUCTION: ICD-10-CM

## 2019-03-27 DIAGNOSIS — E80.6 HYPERBILIRUBINEMIA: ICD-10-CM

## 2019-03-27 DIAGNOSIS — R74.8 ELEVATED LIVER ENZYMES: Primary | ICD-10-CM

## 2019-03-27 DIAGNOSIS — N17.9 ACUTE KIDNEY INJURY (HCC): ICD-10-CM

## 2019-03-27 PROBLEM — D72.829 LEUKOCYTOSIS: Status: ACTIVE | Noted: 2019-03-21

## 2019-03-27 PROBLEM — E83.119 HEMOCHROMATOSIS: Status: ACTIVE | Noted: 2019-03-21

## 2019-03-27 PROCEDURE — 99495 TRANSJ CARE MGMT MOD F2F 14D: CPT | Performed by: FAMILY MEDICINE

## 2019-03-27 RX ORDER — ACETAMINOPHEN 500 MG
500 TABLET ORAL EVERY 6 HOURS PRN
COMMUNITY
Start: 2018-02-03

## 2019-03-27 RX ORDER — KETOCONAZOLE 20 MG/G
CREAM TOPICAL
Refills: 2 | COMMUNITY
Start: 2019-03-13

## 2019-03-27 RX ORDER — DIPHENOXYLATE HYDROCHLORIDE AND ATROPINE SULFATE 2.5; .025 MG/1; MG/1
1 TABLET ORAL
COMMUNITY
End: 2019-03-27 | Stop reason: SDUPTHER

## 2019-03-31 NOTE — PROGRESS NOTES
Assessment/Plan:  Patient's hospitalization has been reviewed in full  Creatinine and GFR have returned back to his normal baseline  Will repeat a hepatic panel in a couple weeks  Sugars remained stable  Hospitalization and test reviewed at length  He will also follow up with Gastroenterology  Diagnoses and all orders for this visit:    Elevated liver enzymes  -     Hepatic function panel; Future    Acute kidney injury ([address] )    Hyperbilirubinemia  -     Hepatic function panel; Future    Calculus of bile duct without cholecystitis with obstruction    Type 2 diabetes mellitus without complication, without long-term current use of insulin (HCC)          Subjective:        Patient ID: Xiao Decker is a 46 y o  male  Chief Complaint   Patient presents with    Transition of Care Management     Patient is following up from hospital visit on 03/21/19, discharged on 03/22/19; removing common bile duct stone  Patient feels "fine" today  Patient status post hospitalization at [facility] initially with chest pain like symptoms  While there found to have elevated liver enzymes as well as mild AK I  Culprit was a common bile duct stone  Patient underwent ERCP and has improved  Care for hospital follow-up  Patient thought his sugars were going too low until he realized something else was going on  Review of systems is as of now      The following portions of the patient's history were reviewed and updated as appropriate: allergies, current medications, past family history, past social history and problem list     Review of Systems   Constitutional: Negative for appetite change, fatigue, fever and unexpected weight change  HENT: Negative for congestion, ear pain, postnasal drip, rhinorrhea, sinus pressure, sinus pain and sore throat  Eyes: Negative for redness and visual disturbance  Respiratory: Negative for chest tightness and shortness of breath      Cardiovascular: Negative for chest pain, palpitations and leg swelling  Gastrointestinal: Negative for abdominal distention, abdominal pain, diarrhea and nausea  Endocrine: Negative for cold intolerance and heat intolerance  Genitourinary: Negative for dysuria and hematuria  Musculoskeletal: Negative for arthralgias, gait problem and myalgias  Skin: Negative for pallor and rash  Neurological: Negative for dizziness and headaches  Psychiatric/Behavioral: Negative for behavioral problems  The patient is not nervous/anxious  Objective:  TCM Call (since 2/28/2019)     None      TCM Call (since 2/28/2019)     None        /70 (BP Location: Left arm, Patient Position: Sitting, Cuff Size: Standard)   Ht 6' 2" (1 88 m)   Wt 91 3 kg (201 lb 3 2 oz)   BMI 25 83 kg/m²      Physical Exam   Constitutional: He is oriented to person, place, and time  He appears well-nourished  No distress  HENT:   Head: Normocephalic and atraumatic  Right Ear: Tympanic membrane normal    Left Ear: Tympanic membrane normal    Nose: Nose normal    Mouth/Throat: Oropharynx is clear and moist    Eyes: Pupils are equal, round, and reactive to light  Conjunctivae and EOM are normal  No scleral icterus  Neck: Neck supple  No thyromegaly present  Cardiovascular: Normal rate, regular rhythm and intact distal pulses  No murmur heard  Pulses:       Carotid pulses are 0 on the right side, and 0 on the left side  Pulmonary/Chest: Effort normal and breath sounds normal  He has no wheezes  Abdominal: Soft  He exhibits no distension and no mass  There is no tenderness  Musculoskeletal: He exhibits no edema  Lymphadenopathy:     He has no cervical adenopathy  Neurological: He is alert and oriented to person, place, and time  He has normal reflexes  No cranial nerve deficit  Coordination normal    Skin: Skin is warm  No pallor  Psychiatric: He has a normal mood and affect   His behavior is normal  Thought content normal    Nursing note and vitals reviewed

## 2019-04-05 LAB
CREAT ?TM UR-SCNC: 109 UMOL/L
EXT PROTEIN URINE: 20
PROT/CREAT UR: 0.18 MG/G{CREAT}

## 2019-05-20 LAB
LEFT EYE DIABETIC RETINOPATHY: NORMAL
RIGHT EYE DIABETIC RETINOPATHY: NORMAL

## 2019-07-08 LAB — HBA1C MFR BLD HPLC: 7 %

## 2019-07-17 ENCOUNTER — OFFICE VISIT (OUTPATIENT)
Dept: FAMILY MEDICINE CLINIC | Facility: CLINIC | Age: 54
End: 2019-07-17

## 2019-07-17 VITALS
HEART RATE: 66 BPM | TEMPERATURE: 98.2 F | DIASTOLIC BLOOD PRESSURE: 62 MMHG | WEIGHT: 204.8 LBS | SYSTOLIC BLOOD PRESSURE: 102 MMHG | OXYGEN SATURATION: 98 % | BODY MASS INDEX: 26.29 KG/M2

## 2019-07-17 DIAGNOSIS — R74.8 ELEVATED LIVER ENZYMES: ICD-10-CM

## 2019-07-17 DIAGNOSIS — N18.30 CKD (CHRONIC KIDNEY DISEASE) STAGE 3, GFR 30-59 ML/MIN (HCC): ICD-10-CM

## 2019-07-17 DIAGNOSIS — E78.2 MIXED HYPERLIPIDEMIA: ICD-10-CM

## 2019-07-17 DIAGNOSIS — E11.9 TYPE 2 DIABETES MELLITUS WITHOUT COMPLICATION, WITHOUT LONG-TERM CURRENT USE OF INSULIN (HCC): Primary | ICD-10-CM

## 2019-07-17 PROCEDURE — 99214 OFFICE O/P EST MOD 30 MIN: CPT | Performed by: FAMILY MEDICINE

## 2019-07-17 NOTE — PROGRESS NOTES
BMI Counseling: Body mass index is 26 29 kg/m²  Discussed the patient's BMI with him  The BMI is above average  BMI counseling and education was provided to the patient  Nutrition recommendations include reducing portion sizes, decreasing overall calorie intake, consuming healthier snacks and moderation in carbohydrate intake  Exercise recommendations include exercising 3-5 times per week  Referral to a nutritionist was provided to the patient

## 2019-07-29 ENCOUNTER — TELEPHONE (OUTPATIENT)
Dept: FAMILY MEDICINE CLINIC | Facility: CLINIC | Age: 54
End: 2019-07-29

## 2019-07-29 NOTE — TELEPHONE ENCOUNTER
Patient called and stated he takes fish oil once daily and its been giving him gas and diarrhea  Asking what he should do

## 2019-08-03 PROBLEM — N17.9 AKI (ACUTE KIDNEY INJURY) (HCC): Status: RESOLVED | Noted: 2019-03-21 | Resolved: 2019-08-03

## 2019-08-03 NOTE — PROGRESS NOTES
Assessment/Plan:    Blood pressure today is stable  CKD level 3 is stable  Continue follow-up with [facility]  A1c is 7 0 on 25 mg of Januvia  Refer to local dietitian before changing any dosing of medication  LDL cholesterol is nicely below 100 on statin  Patient's ALT is just slightly above normal   Patient has recently Francia seen through  [facility] recommend flu shot in the fall  Recheck 6 months with repeat labs  GERD stable  Recommend yearly diabetic foot an eye examination     Diagnoses and all orders for this visit:    Type 2 diabetes mellitus without complication, without long-term current use of insulin (HCC)  -     Hemoglobin A1C; Future    CKD (chronic kidney disease) stage 3, GFR 30-59 ml/min (HCC)    Mixed hyperlipidemia    Elevated liver enzymes        1  Type 2 diabetes mellitus without complication, without long-term current use of insulin (HCC)  Hemoglobin A1C   2  CKD (chronic kidney disease) stage 3, GFR 30-59 ml/min (HCC)     3  Mixed hyperlipidemia     4  Elevated liver enzymes         Subjective:        Patient ID: Senait Godinez is a 46 y o  male  Chief Complaint   Patient presents with    Follow-up      month CKD   DM Discuss Pneumococcal and Hep C       Patient here for routine recheck  Overall well  Had ERCP back in March  The following portions of the patient's history were reviewed and updated as appropriate: past medical history, past surgical history and problem list       Review of Systems   Constitutional: Negative for appetite change, fatigue, fever and unexpected weight change  HENT: Negative for congestion, ear pain, postnasal drip, rhinorrhea, sinus pressure, sinus pain and sore throat  Eyes: Negative for redness and visual disturbance  Respiratory: Negative for chest tightness and shortness of breath  Cardiovascular: Negative for chest pain, palpitations and leg swelling     Gastrointestinal: Negative for abdominal distention, abdominal pain, diarrhea and nausea  Endocrine: Negative for cold intolerance and heat intolerance  Genitourinary: Negative for dysuria and hematuria  Musculoskeletal: Negative for arthralgias, gait problem and myalgias  Skin: Negative for pallor and rash  Neurological: Positive for tremors  Negative for dizziness and headaches  Psychiatric/Behavioral: Negative for behavioral problems  The patient is not nervous/anxious  Objective:  /62 (BP Location: Left arm, Patient Position: Sitting, Cuff Size: Standard)   Pulse 66   Temp 98 2 °F (36 8 °C) (Temporal)   Wt 92 9 kg (204 lb 12 8 oz)   SpO2 98%   BMI 26 29 kg/m²        Physical Exam   Constitutional: He is oriented to person, place, and time  He appears well-nourished  No distress  HENT:   Head: Normocephalic and atraumatic  Right Ear: Tympanic membrane and external ear normal    Left Ear: Tympanic membrane normal    Nose: Nose normal    Eyes: Pupils are equal, round, and reactive to light  Conjunctivae and EOM are normal  No scleral icterus  Neck: Neck supple  No thyromegaly present  Cardiovascular: Normal rate, regular rhythm and intact distal pulses  No murmur heard  Pulses:       Carotid pulses are 0 on the right side, and 0 on the left side  Pulmonary/Chest: Effort normal and breath sounds normal  He has no wheezes  Abdominal: Soft  He exhibits no distension  There is no tenderness  Musculoskeletal: He exhibits no edema  Lymphadenopathy:     He has no cervical adenopathy  Neurological: He is alert and oriented to person, place, and time  He has normal reflexes  No cranial nerve deficit  Coordination normal    Skin: Skin is warm  No pallor  Psychiatric: He has a normal mood and affect  His behavior is normal  Thought content normal    Nursing note and vitals reviewed

## 2019-08-06 LAB — HCV AB SER-ACNC: NEGATIVE

## 2019-08-09 ENCOUNTER — TELEPHONE (OUTPATIENT)
Dept: FAMILY MEDICINE CLINIC | Facility: CLINIC | Age: 54
End: 2019-08-09

## 2019-08-09 NOTE — TELEPHONE ENCOUNTER
Tell him it is hard to say why he feels like his body temp is off  It could be because of the elevated liver enzymes  He should take his temperature and just make sure nothing is worsening    If he does get worse over the weekend he should go back to the ER be checked

## 2019-08-09 NOTE — TELEPHONE ENCOUNTER
Tell area I did look through everything as far as labs in the emergency room visit  Obviously the liver enzymes are higher    The next that is the biopsy as recommended by his gastroenterologist

## 2019-08-09 NOTE — TELEPHONE ENCOUNTER
Pt is wondering if there any reason why he is still having the chills   Like stated he feels better physically just that his "body temp feels off"

## 2019-08-09 NOTE — TELEPHONE ENCOUNTER
Pt called stating that he would just like for you to take a look at his BW, he is aware of the results but he was wondering if you had anything else to say about it  He also stated that he went to the ER on 8/4/2019 with chills nausea and fatigue and that's when they did all the BW  Pt states that he is feeling a lot better now but he just feels like his body temp is off  He also stated that his "liver Dr " said that he needs a liver biopsy and that is going to be done on 8/22/2019  Pt just wanted to make you aware of all this and see if you had anything to say   Please advise, thanks

## 2019-08-27 ENCOUNTER — TELEPHONE (OUTPATIENT)
Dept: FAMILY MEDICINE CLINIC | Facility: CLINIC | Age: 54
End: 2019-08-27

## 2019-08-27 NOTE — TELEPHONE ENCOUNTER
Patient stopped in the office and stated that he saw [facility] and they did a liver biopsy and ERCP last Thursday  They recommend that he gets blood work done in 2 weeks but they want you to order the blood work  Asking for a liver panel to be done  They want him to go for the blood work around 09/11/2019 because he has a follow up with them on 09/19/2019  States we can mail the blood work script  no nasal congestion/no vertigo/no hearing difficulty/no ear pain/no tinnitus/no sinus symptoms

## 2019-08-28 NOTE — TELEPHONE ENCOUNTER
I am not sure where the liver specialist or asking the primary to order liver testing on their patient    Can be clarify this with their office as it seems slightly unusual

## 2019-08-29 NOTE — TELEPHONE ENCOUNTER
Lm on nurses line at [facility] to clarify the need for the liver panel and why we are ordering it verus their office

## 2019-08-29 NOTE — TELEPHONE ENCOUNTER
Dr Stuart Omer MA called back at [facility] and states she has no idea why patient would stop in our office  They are following his liver therefore if he needed bw then they would order it  States to disregard this message

## 2019-09-10 DIAGNOSIS — E78.00 PURE HYPERCHOLESTEROLEMIA: ICD-10-CM

## 2019-09-10 DIAGNOSIS — E11.9 TYPE 2 DIABETES MELLITUS WITHOUT COMPLICATION, WITHOUT LONG-TERM CURRENT USE OF INSULIN (HCC): ICD-10-CM

## 2019-09-11 RX ORDER — SITAGLIPTIN 25 MG/1
TABLET, FILM COATED ORAL
Qty: 90 TABLET | Refills: 4 | Status: SHIPPED | OUTPATIENT
Start: 2019-09-11 | End: 2020-09-14

## 2019-09-11 RX ORDER — ATORVASTATIN CALCIUM 20 MG/1
TABLET, FILM COATED ORAL
Qty: 90 TABLET | Refills: 4 | Status: SHIPPED | OUTPATIENT
Start: 2019-09-11 | End: 2020-09-14

## 2019-10-08 LAB
CREAT ?TM UR-SCNC: 121 UMOL/L
EXT PROTEIN URINE: 21.2
HBA1C MFR BLD HPLC: 7 %
PROT/CREAT UR: 0.18 MG/G{CREAT}

## 2020-01-09 LAB — HBA1C MFR BLD HPLC: 6.8 %

## 2020-01-17 ENCOUNTER — OFFICE VISIT (OUTPATIENT)
Dept: FAMILY MEDICINE CLINIC | Facility: CLINIC | Age: 55
End: 2020-01-17

## 2020-01-17 VITALS
HEIGHT: 75 IN | SYSTOLIC BLOOD PRESSURE: 120 MMHG | BODY MASS INDEX: 25.47 KG/M2 | DIASTOLIC BLOOD PRESSURE: 86 MMHG | WEIGHT: 204.8 LBS | HEART RATE: 70 BPM | TEMPERATURE: 97.5 F | OXYGEN SATURATION: 98 %

## 2020-01-17 DIAGNOSIS — E78.00 PURE HYPERCHOLESTEROLEMIA: Primary | ICD-10-CM

## 2020-01-17 DIAGNOSIS — E11.9 TYPE 2 DIABETES MELLITUS WITHOUT COMPLICATION, WITHOUT LONG-TERM CURRENT USE OF INSULIN (HCC): ICD-10-CM

## 2020-01-17 DIAGNOSIS — N18.30 CHRONIC RENAL INSUFFICIENCY, STAGE III (MODERATE) (HCC): ICD-10-CM

## 2020-01-17 DIAGNOSIS — E83.119 HEMOCHROMATOSIS, UNSPECIFIED HEMOCHROMATOSIS TYPE: ICD-10-CM

## 2020-01-17 PROBLEM — N25.81 HYPERPARATHYROIDISM DUE TO RENAL INSUFFICIENCY (HCC): Status: ACTIVE | Noted: 2019-10-15

## 2020-01-17 PROCEDURE — 99213 OFFICE O/P EST LOW 20 MIN: CPT | Performed by: FAMILY MEDICINE

## 2020-01-20 NOTE — PROGRESS NOTES
BMI Counseling: Body mass index is 25 94 kg/m²  The BMI is above normal  Nutrition recommendations include decreasing portion sizes, encouraging healthy choices of fruits and vegetables, decreasing fast food intake, consuming healthier snacks, limiting drinks that contain sugar, moderation in carbohydrate intake, increasing intake of lean protein, reducing intake of saturated and trans fat and reducing intake of cholesterol  Exercise recommendations include exercising 3-5 times per week  Patient referred to nutritionist due to patient being overweight

## 2020-01-20 NOTE — PROGRESS NOTES
Assessment/Plan:    LDL less than 100  Continue statin  Continue low-dose Januvia  A1c is are excellent  Continue follow-up with Nephrology and Gastroenterology regarding other medical issues  Recheck 6 months with repeat labs at that time  Diagnoses and all orders for this visit:    Pure hypercholesterolemia  -     Lipid panel; Future    Type 2 diabetes mellitus without complication, without long-term current use of insulin (HCC)    Chronic renal insufficiency, stage III (moderate) (HCC)    Hemochromatosis, unspecified hemochromatosis type        1  Pure hypercholesterolemia  Lipid panel   2  Type 2 diabetes mellitus without complication, without long-term current use of insulin ([address] )     3  Chronic renal insufficiency, stage III (moderate) (HCC)     4  Hemochromatosis, unspecified hemochromatosis type         Subjective:        Patient ID: Marvin Hoffman is a 53 y o  male  Chief Complaint   Patient presents with    Follow-up     6 months; pt would like to discuss BW       Patient for blood pressure check and recheck of labs  Overall well  Unfortunately father just passed away      The following portions of the patient's history were reviewed and updated as appropriate: past medical history, past surgical history and problem list       Review of Systems   Constitutional: Negative for fatigue  Eyes: Negative for visual disturbance  Respiratory: Negative for cough and shortness of breath  Cardiovascular: Negative for chest pain, palpitations and leg swelling  Gastrointestinal: Negative for abdominal pain  Neurological: Negative for dizziness and headaches  Psychiatric/Behavioral: The patient is not nervous/anxious            Objective:  /86 (BP Location: Left arm, Patient Position: Sitting, Cuff Size: Standard)   Pulse 70   Temp 97 5 °F (36 4 °C)   Ht 6' 2 5" (1 892 m)   Wt 92 9 kg (204 lb 12 8 oz)   SpO2 98%   BMI 25 94 kg/m²        Physical Exam   Constitutional: He is oriented to person, place, and time  He appears well-nourished  No distress  HENT:   Head: Normocephalic  Eyes: Pupils are equal, round, and reactive to light  No scleral icterus  Cardiovascular: Normal heart sounds  No murmur heard  Pulmonary/Chest: Breath sounds normal    Musculoskeletal: He exhibits no edema  Lymphadenopathy:     He has no cervical adenopathy  Neurological: He is alert and oriented to person, place, and time  Psychiatric: He has a normal mood and affect  His behavior is normal  Thought content normal    Nursing note and vitals reviewed

## 2020-04-01 ENCOUNTER — TELEPHONE (OUTPATIENT)
Dept: FAMILY MEDICINE CLINIC | Facility: CLINIC | Age: 55
End: 2020-04-01

## 2020-06-02 LAB
LEFT EYE DIABETIC RETINOPATHY: NORMAL
RIGHT EYE DIABETIC RETINOPATHY: NORMAL

## 2020-07-17 ENCOUNTER — OFFICE VISIT (OUTPATIENT)
Dept: FAMILY MEDICINE CLINIC | Facility: CLINIC | Age: 55
End: 2020-07-17

## 2020-07-17 VITALS
HEART RATE: 73 BPM | OXYGEN SATURATION: 98 % | DIASTOLIC BLOOD PRESSURE: 76 MMHG | TEMPERATURE: 97.1 F | HEIGHT: 75 IN | WEIGHT: 206.6 LBS | SYSTOLIC BLOOD PRESSURE: 112 MMHG | RESPIRATION RATE: 17 BRPM | BODY MASS INDEX: 25.69 KG/M2

## 2020-07-17 DIAGNOSIS — N18.30 CHRONIC RENAL INSUFFICIENCY, STAGE III (MODERATE) (HCC): ICD-10-CM

## 2020-07-17 DIAGNOSIS — Z12.5 PROSTATE CANCER SCREENING: ICD-10-CM

## 2020-07-17 DIAGNOSIS — I10 ESSENTIAL HYPERTENSION: ICD-10-CM

## 2020-07-17 DIAGNOSIS — E11.9 TYPE 2 DIABETES MELLITUS WITHOUT COMPLICATION, WITHOUT LONG-TERM CURRENT USE OF INSULIN (HCC): Primary | ICD-10-CM

## 2020-07-17 LAB — SL AMB POCT HEMOGLOBIN AIC: 7.1 (ref ?–6.5)

## 2020-07-17 PROCEDURE — 3051F HG A1C>EQUAL 7.0%<8.0%: CPT | Performed by: FAMILY MEDICINE

## 2020-07-17 PROCEDURE — 99213 OFFICE O/P EST LOW 20 MIN: CPT | Performed by: FAMILY MEDICINE

## 2020-07-17 PROCEDURE — 3074F SYST BP LT 130 MM HG: CPT | Performed by: FAMILY MEDICINE

## 2020-07-17 PROCEDURE — 3066F NEPHROPATHY DOC TX: CPT | Performed by: FAMILY MEDICINE

## 2020-07-17 PROCEDURE — 2022F DILAT RTA XM EVC RTNOPTHY: CPT | Performed by: FAMILY MEDICINE

## 2020-07-17 PROCEDURE — 1036F TOBACCO NON-USER: CPT | Performed by: FAMILY MEDICINE

## 2020-07-17 PROCEDURE — 3078F DIAST BP <80 MM HG: CPT | Performed by: FAMILY MEDICINE

## 2020-07-17 PROCEDURE — 83036 HEMOGLOBIN GLYCOSYLATED A1C: CPT | Performed by: FAMILY MEDICINE

## 2020-07-17 RX ORDER — CHOLECALCIFEROL (VITAMIN D3) 125 MCG
2000 CAPSULE ORAL DAILY
COMMUNITY

## 2020-07-19 NOTE — PROGRESS NOTES
Assessment/Plan:  Blood pressure and CKD stable  Patient continues to follow up with Nephrology on a regular basis  PSA due and ordered  Patient's LDL cholesterol is at 39  Triglycerides 1 up to 396 over the winter and current pandemic  Recheck 6 months  Recommend flu shot in the fall  Diagnoses and all orders for this visit:    Type 2 diabetes mellitus without complication, without long-term current use of insulin (HCC)  -     POCT hemoglobin A1c    Prostate cancer screening  -     PSA, Total Screen; Future    Essential hypertension    Chronic renal insufficiency, stage III (moderate) (HCC)    Other orders  -     Cholecalciferol (VITAMIN D3) 50 MCG (2000 UT) TABS; Take 2,000 Units by mouth daily        1  Type 2 diabetes mellitus without complication, without long-term current use of insulin (HCC)  POCT hemoglobin A1c   2  Prostate cancer screening  PSA, Total Screen   3  Essential hypertension     4  Chronic renal insufficiency, stage III (moderate) (HCC)         Subjective:        Patient ID: Al Soliman is a 53 y o  male  Chief Complaint   Patient presents with    Follow-up     6 Month       Patient here for 6 month check  Overall feels well  The following portions of the patient's history were reviewed and updated as appropriate: past medical history, past surgical history and problem list       Review of Systems   Constitutional: Negative for fatigue  Eyes: Negative for visual disturbance  Respiratory: Negative for cough and shortness of breath  Cardiovascular: Negative for chest pain, palpitations and leg swelling  Gastrointestinal: Negative for abdominal pain  Neurological: Negative for dizziness and headaches  Psychiatric/Behavioral: The patient is not nervous/anxious  Patient's shoes and socks removed  Right Foot/Ankle   Right Foot Inspection  Skin Exam: skin normal and skin intact no dry skin, no warmth, no callus, no erythema, no maceration, no abnormal color, no pre-ulcer, no ulcer and no callus                          Toe Exam: ROM and strength within normal limits  Sensory       Monofilament testing: intact  Vascular  Capillary refills: < 3 seconds  The right DP pulse is 2+  The right PT pulse is 2+  Left Foot/Ankle  Left Foot Inspection  Skin Exam: skin normal and skin intactno dry skin, no warmth, no erythema, no maceration, normal color, no pre-ulcer, no ulcer and no callus                         Toe Exam: ROM and strength within normal limits                   Sensory       Monofilament: intact  Vascular  Capillary refills: < 3 seconds  The left DP pulse is 2+  The left PT pulse is 2+  Assign Risk Category:  No deformity present; No loss of protective sensation;        Risk: 0    Objective:  /76 (BP Location: Left arm, Patient Position: Sitting, Cuff Size: Large)   Pulse 73   Temp (!) 97 1 °F (36 2 °C) (Temporal)   Resp 17   Ht 6' 2 5" (1 892 m)   Wt 93 7 kg (206 lb 9 6 oz)   SpO2 98%   BMI 26 17 kg/m²        Physical Exam   Constitutional: He is oriented to person, place, and time  He appears well-nourished  No distress  HENT:   Head: Normocephalic  Right Ear: Tympanic membrane normal    Left Ear: Tympanic membrane normal    Mouth/Throat: Oropharyngeal exudate present  Eyes: Pupils are equal, round, and reactive to light  No scleral icterus  Cardiovascular: Normal heart sounds  No murmur heard  Pulses:       Dorsalis pedis pulses are 2+ on the right side, and 2+ on the left side  Posterior tibial pulses are 2+ on the right side, and 2+ on the left side  Pulmonary/Chest: Breath sounds normal    Musculoskeletal: He exhibits no edema  Feet:   Right Foot:   Skin Integrity: Negative for ulcer, skin breakdown, erythema, warmth, callus or dry skin  Left Foot:   Skin Integrity: Negative for ulcer, skin breakdown, erythema, warmth, callus or dry skin  Lymphadenopathy:     He has no cervical adenopathy     Neurological: He is alert and oriented to person, place, and time  Psychiatric: He has a normal mood and affect  His behavior is normal  Thought content normal    Nursing note and vitals reviewed

## 2020-09-14 DIAGNOSIS — E78.00 PURE HYPERCHOLESTEROLEMIA: ICD-10-CM

## 2020-09-14 DIAGNOSIS — E11.9 TYPE 2 DIABETES MELLITUS WITHOUT COMPLICATION, WITHOUT LONG-TERM CURRENT USE OF INSULIN (HCC): ICD-10-CM

## 2020-09-14 RX ORDER — SITAGLIPTIN 25 MG/1
TABLET, FILM COATED ORAL
Qty: 90 TABLET | Refills: 3 | Status: SHIPPED | OUTPATIENT
Start: 2020-09-14 | End: 2021-09-13

## 2020-09-14 RX ORDER — ATORVASTATIN CALCIUM 20 MG/1
TABLET, FILM COATED ORAL
Qty: 90 TABLET | Refills: 3 | Status: SHIPPED | OUTPATIENT
Start: 2020-09-14 | End: 2021-12-14

## 2020-09-23 ENCOUNTER — TELEPHONE (OUTPATIENT)
Dept: FAMILY MEDICINE CLINIC | Facility: CLINIC | Age: 55
End: 2020-09-23

## 2020-09-23 NOTE — TELEPHONE ENCOUNTER
He could try a dose of milk of magnesia which is a laxative  If it continues then he should start daily MiraLax    If he develops any serious abdominal pain he would need to go to the emergency room

## 2020-09-23 NOTE — TELEPHONE ENCOUNTER
Patient is constipated and has pain for the last 30 min  He did use Gavisgon  Is there anything else that he can do?

## 2020-10-19 ENCOUNTER — CLINICAL SUPPORT (OUTPATIENT)
Dept: FAMILY MEDICINE CLINIC | Facility: CLINIC | Age: 55
End: 2020-10-19

## 2020-10-19 DIAGNOSIS — E11.9 TYPE 2 DIABETES MELLITUS WITHOUT COMPLICATION, WITHOUT LONG-TERM CURRENT USE OF INSULIN (HCC): Primary | ICD-10-CM

## 2020-10-19 LAB — SL AMB POCT HEMOGLOBIN AIC: 6.7 (ref ?–6.5)

## 2020-10-19 PROCEDURE — 83036 HEMOGLOBIN GLYCOSYLATED A1C: CPT

## 2021-01-06 ENCOUNTER — TELEPHONE (OUTPATIENT)
Dept: FAMILY MEDICINE CLINIC | Facility: CLINIC | Age: 56
End: 2021-01-06

## 2021-01-06 DIAGNOSIS — Z20.822 CLOSE EXPOSURE TO COVID-19 VIRUS: Primary | ICD-10-CM

## 2021-01-06 NOTE — TELEPHONE ENCOUNTER
Patient was exposed to somebody with Covid on 12/30/2020 he did have a mask on less than 6 feet for 2 hours  Patient doesn't have any current symptoms  Should he get tested, please advise patient at [phone]

## 2021-01-06 NOTE — TELEPHONE ENCOUNTER
Tell the patient with his history I would be safe and get checked  Will place an order in he can go to [place] [id number] and do the drive-through swab tell him results take 48 hours  Would recommend that he kind of isolated home until test results are back    he can call Friday afternoon for results

## 2021-01-07 DIAGNOSIS — Z20.822 CLOSE EXPOSURE TO COVID-19 VIRUS: ICD-10-CM

## 2021-01-07 PROCEDURE — U0005 INFEC AGEN DETEC AMPLI PROBE: HCPCS | Performed by: FAMILY MEDICINE

## 2021-01-07 PROCEDURE — U0003 INFECTIOUS AGENT DETECTION BY NUCLEIC ACID (DNA OR RNA); SEVERE ACUTE RESPIRATORY SYNDROME CORONAVIRUS 2 (SARS-COV-2) (CORONAVIRUS DISEASE [COVID-19]), AMPLIFIED PROBE TECHNIQUE, MAKING USE OF HIGH THROUGHPUT TECHNOLOGIES AS DESCRIBED BY CMS-2020-01-R: HCPCS | Performed by: FAMILY MEDICINE

## 2021-01-08 LAB — SARS-COV-2 RNA SPEC QL NAA+PROBE: NOT DETECTED

## 2021-01-20 ENCOUNTER — TELEPHONE (OUTPATIENT)
Dept: FAMILY MEDICINE CLINIC | Facility: CLINIC | Age: 56
End: 2021-01-20

## 2021-01-20 NOTE — TELEPHONE ENCOUNTER
I called Maurilio Lafleur he is vomiting  Pt was made  aware you recommended/ advised  That he  go to the ER  Pt can not roll over in bed  Pt is calling 911  since he can not drive  Pt stated  " I am calling 911  Pt expressed verbally he is agreeable to going  to the ER

## 2021-01-20 NOTE — TELEPHONE ENCOUNTER
Umang Underwood called the office he has been feeling fine but all of a sudden after getting home form the store pt is dizzy ,nasuous/nausea  and sweating  While in the store pt had to lift his mask down a few times, he felt overheated  Pt is dizzy even when trying to lay down  Pt's will take temp then call back  Pt has an appointment with Abhilash Wyatt on Friday 1/22

## 2021-01-20 NOTE — TELEPHONE ENCOUNTER
Pt called back he is very nauseous/ dizzy  He has diarrhea that started today  He had a little earlier but has had diarrhea twice in the last hour  Dizzy/nauesous started in last 1 1/2 hours  Pt has a headache a little not to much  Pt feels like he is starting to get flu like symptoms  Pt has a kidney issue if he vomits that can lead to dehydration  Pt has lower bp not high  Pt takes losartan  Pt's number is [phone]    Pt is asking if she should go to the ER tonight? He is agreeable

## 2021-01-22 ENCOUNTER — OFFICE VISIT (OUTPATIENT)
Dept: FAMILY MEDICINE CLINIC | Facility: CLINIC | Age: 56
End: 2021-01-22
Payer: COMMERCIAL

## 2021-01-22 VITALS
TEMPERATURE: 97.7 F | HEIGHT: 75 IN | RESPIRATION RATE: 16 BRPM | HEART RATE: 76 BPM | DIASTOLIC BLOOD PRESSURE: 72 MMHG | WEIGHT: 198.2 LBS | BODY MASS INDEX: 24.64 KG/M2 | OXYGEN SATURATION: 98 % | SYSTOLIC BLOOD PRESSURE: 130 MMHG

## 2021-01-22 DIAGNOSIS — N40.1 BENIGN PROSTATIC HYPERPLASIA WITH WEAK URINARY STREAM: ICD-10-CM

## 2021-01-22 DIAGNOSIS — Z12.11 COLON CANCER SCREENING: ICD-10-CM

## 2021-01-22 DIAGNOSIS — E78.00 PURE HYPERCHOLESTEROLEMIA: ICD-10-CM

## 2021-01-22 DIAGNOSIS — R39.12 BENIGN PROSTATIC HYPERPLASIA WITH WEAK URINARY STREAM: ICD-10-CM

## 2021-01-22 DIAGNOSIS — E11.9 TYPE 2 DIABETES MELLITUS WITHOUT COMPLICATION, WITHOUT LONG-TERM CURRENT USE OF INSULIN (HCC): Primary | ICD-10-CM

## 2021-01-22 PROCEDURE — 99213 OFFICE O/P EST LOW 20 MIN: CPT | Performed by: FAMILY MEDICINE

## 2021-02-06 NOTE — PROGRESS NOTES
Assessment/Plan: last A1c was 6 7  Blood pressure stable  Renal and liver status stable  Patient will stay with us every 6 months with repeat labs 3 knee special to except  COVID risk reviewed COVID vaccine reviewed  No problem-specific Assessment & Plan notes found for this encounter  Diagnoses and all orders for this visit:    Type 2 diabetes mellitus without complication, without long-term current use of insulin (HCC)  -     Hemoglobin A1C; Future  -     Hepatic function panel; Future    Pure hypercholesterolemia  -     Lipid panel; Future  -     Hepatic function panel; Future    Colon cancer screening  -     Occult Blood, Fecal Immunochemical; Future        1  Type 2 diabetes mellitus without complication, without long-term current use of insulin (HCC)  Hemoglobin A1C    Hepatic function panel   2  Pure hypercholesterolemia  Lipid panel    Hepatic function panel   3  Colon cancer screening  Occult Blood, Fecal Immunochemical       Subjective:        Patient ID: Jaime Roblero is a 63 y o  male  Chief Complaint   Patient presents with    Follow-up         Routine recheck no complaint      The following portions of the patient's history were reviewed and updated as appropriate: past medical history, past surgical history and problem list       Review of Systems   Constitutional: Negative for fatigue  Eyes: Negative for visual disturbance  Respiratory: Negative for cough and shortness of breath  Cardiovascular: Negative for chest pain, palpitations and leg swelling  Gastrointestinal: Negative for abdominal pain  Neurological: Negative for dizziness and headaches  Psychiatric/Behavioral: The patient is not nervous/anxious            Objective:  /72 (BP Location: Left arm, Patient Position: Sitting, Cuff Size: Standard)   Pulse 76   Temp 97 7 °F (36 5 °C) (Temporal)   Resp 16   Ht 6' 2 5" (1 892 m)   Wt 89 9 kg (198 lb 3 2 oz)   SpO2 98%   BMI 25 11 kg/m²        Physical Exam  Vitals signs and nursing note reviewed  Constitutional:       General: He is not in acute distress  HENT:      Head: Normocephalic  Eyes:      General: No scleral icterus  Pupils: Pupils are equal, round, and reactive to light  Cardiovascular:      Heart sounds: Normal heart sounds  No murmur  Pulmonary:      Breath sounds: Normal breath sounds  Musculoskeletal:      Right lower leg: No edema  Left lower leg: No edema  Lymphadenopathy:      Cervical: No cervical adenopathy  Skin:     Coloration: Skin is not pale  Neurological:      Mental Status: He is alert and oriented to person, place, and time  Psychiatric:         Behavior: Behavior normal          Thought Content:  Thought content normal

## 2021-02-08 ENCOUNTER — TELEPHONE (OUTPATIENT)
Dept: FAMILY MEDICINE CLINIC | Facility: CLINIC | Age: 56
End: 2021-02-08

## 2021-02-08 NOTE — TELEPHONE ENCOUNTER
I spoke to the patient, he is going to have this billed through his insurance and will pay for any remaining balance

## 2021-02-08 NOTE — TELEPHONE ENCOUNTER
Shea Puga called today- he is asking about the fecal immuncochemical test  He self pays here- he is wondering if he should self pay with that or use his insurance? Please advise  I told him I would ask you and give him a call back  Thank you!

## 2021-03-17 ENCOUNTER — TELEPHONE (OUTPATIENT)
Dept: FAMILY MEDICINE CLINIC | Facility: CLINIC | Age: 56
End: 2021-03-17

## 2021-03-17 NOTE — TELEPHONE ENCOUNTER
If his PSA was ordered as a screening PSA with a diagnosis code of screening for prostate cancer, his insurance [employer] [facility]) does not cover this

## 2021-03-17 NOTE — TELEPHONE ENCOUNTER
Patient called today -- he went for a prostate screening in January he called insurance and it says the wrong code was on it so he is being charged for the whole amount  The code billed was for CPT code [id number]  DOS is 01/18  Patient is asking if we can fix this? Can you look into this at all? Patient can be reached at [phone]

## 2021-03-18 NOTE — TELEPHONE ENCOUNTER
I spoke to Henna Bright at [place] [id number], provided updated diagnosis codes, bill will be reprocessed

## 2021-03-18 NOTE — TELEPHONE ENCOUNTER
Yes, but you need to do this by placing a new order with the corrected information and under comments you need to put the original date of service, updated for billing purposes  Then make me aware this has been done    Thanks

## 2021-06-21 ENCOUNTER — TELEPHONE (OUTPATIENT)
Dept: FAMILY MEDICINE CLINIC | Facility: CLINIC | Age: 56
End: 2021-06-21

## 2021-06-21 DIAGNOSIS — E78.00 PURE HYPERCHOLESTEROLEMIA: ICD-10-CM

## 2021-06-21 DIAGNOSIS — E11.9 TYPE 2 DIABETES MELLITUS WITHOUT COMPLICATION, WITHOUT LONG-TERM CURRENT USE OF INSULIN (HCC): Primary | ICD-10-CM

## 2021-06-21 NOTE — TELEPHONE ENCOUNTER
A1c went up slightly to 7 0  Liver number also went up slightly  This could be due to the cholesterol medication or due to his  chronic elevated triglycerides  Would recommend that he take half of his atorvastatin daily and we will repeat his liver numbers and A1c in 3 months    See if willing to do so in send back so I can order

## 2021-06-21 NOTE — TELEPHONE ENCOUNTER
Spoke w/ pt and gave message  Pt understood  Can you please order blood work to be repeated by pt?  Thanks

## 2021-06-21 NOTE — TELEPHONE ENCOUNTER
Pt called today  Asking if you can please look over the recent blood work that he has had  It is under media from [facility]  Please advise and make pt aware

## 2021-06-22 NOTE — TELEPHONE ENCOUNTER
I called Gayr Hill and left him a detailed message consent ok informing him Yancy Wright requested we call to make him aware that he placed the orders and we will mail to his home  Pt was advised it is fasting blwk and per Yancy Wright he is to repeat liver number and A1C in 3 months  Any questions please call the office  blwk orders were placed in mail

## 2021-06-28 ENCOUNTER — TELEPHONE (OUTPATIENT)
Dept: FAMILY MEDICINE CLINIC | Facility: CLINIC | Age: 56
End: 2021-06-28

## 2021-06-28 DIAGNOSIS — R74.01 ELEVATED ALT MEASUREMENT: Primary | ICD-10-CM

## 2021-06-28 NOTE — TELEPHONE ENCOUNTER
Patient stopped in and would like for somebody to call him and go over his blood work that was done on 06/17/21  Please call patient at [phone]

## 2021-06-28 NOTE — TELEPHONE ENCOUNTER
Tell the patient his hemoglobin A1c has gone from 6 7 to 7 0   triglycerides are little bit lower at 327 which is better than previous 1 liver number the ALT is slightly elevated from previous testing  Would recommend focusing on diet containing lower fat, saturated fat, fried food  Recommend repeat liver enzymes in 6-8 weeks  Please mail him slip    Otherwise we will see him in December depending on what his next test shows

## 2021-07-08 NOTE — TELEPHONE ENCOUNTER
If he is taking omeprazole daily he can use over-the-counter generic Pepcid once or twice a day as needed for any breakthrough stomach complaints

## 2021-07-08 NOTE — TELEPHONE ENCOUNTER
Tell him yes he should call his gastro to make a routine appointment    Ask him if he would like me to send in some prescription Pepcid twice daily until he sees GI to see if this helps

## 2021-07-08 NOTE — TELEPHONE ENCOUNTER
Patient has appointment to GI in August 05, 2021 patient states he is currently taking omeprazole should he also take the Pepcid  Also should he do any other blood work?

## 2021-09-10 ENCOUNTER — TELEPHONE (OUTPATIENT)
Dept: FAMILY MEDICINE CLINIC | Facility: CLINIC | Age: 56
End: 2021-09-10

## 2021-09-10 NOTE — TELEPHONE ENCOUNTER
Please tell patient that his A1c last time was 7 0  This 1 was 7 1  We will review this in December  Triglycerides last time were 327 have gone down to 277    The rest of his liver numbers are stable

## 2021-09-13 DIAGNOSIS — E11.9 TYPE 2 DIABETES MELLITUS WITHOUT COMPLICATION, WITHOUT LONG-TERM CURRENT USE OF INSULIN (HCC): ICD-10-CM

## 2021-09-13 RX ORDER — SITAGLIPTIN 25 MG/1
TABLET, FILM COATED ORAL
Qty: 90 TABLET | Refills: 3 | Status: SHIPPED | OUTPATIENT
Start: 2021-09-13

## 2021-10-06 ENCOUNTER — TELEPHONE (OUTPATIENT)
Dept: FAMILY MEDICINE CLINIC | Facility: CLINIC | Age: 56
End: 2021-10-06

## 2021-12-17 ENCOUNTER — OFFICE VISIT (OUTPATIENT)
Dept: FAMILY MEDICINE CLINIC | Facility: CLINIC | Age: 56
End: 2021-12-17

## 2021-12-17 DIAGNOSIS — Z12.5 PROSTATE CANCER SCREENING: ICD-10-CM

## 2021-12-17 DIAGNOSIS — E11.9 TYPE 2 DIABETES MELLITUS WITHOUT COMPLICATION, WITHOUT LONG-TERM CURRENT USE OF INSULIN (HCC): Primary | ICD-10-CM

## 2021-12-17 DIAGNOSIS — E78.00 PURE HYPERCHOLESTEROLEMIA: ICD-10-CM

## 2021-12-17 DIAGNOSIS — I10 ESSENTIAL HYPERTENSION: ICD-10-CM

## 2021-12-17 DIAGNOSIS — N18.30 CHRONIC RENAL IMPAIRMENT, STAGE 3 (MODERATE), UNSPECIFIED WHETHER STAGE 3A OR 3B CKD (HCC): ICD-10-CM

## 2021-12-17 PROBLEM — K80.51 CHOLEDOCHOLITHIASIS WITH OBSTRUCTION: Status: RESOLVED | Noted: 2019-03-21 | Resolved: 2021-12-17

## 2021-12-17 PROCEDURE — 99213 OFFICE O/P EST LOW 20 MIN: CPT | Performed by: FAMILY MEDICINE

## 2021-12-17 NOTE — PROGRESS NOTES
Patient's shoes and socks removed  Right Foot/Ankle   Right Foot Inspection  Skin Exam: dry skin  Toe Exam: ROM and strength within normal limits  Sensory   Monofilament testing: intact    Vascular  Capillary refills: < 3 seconds  The right DP pulse is 2+  The right PT pulse is 2+  Left Foot/Ankle  Left Foot Inspection  Skin Exam: dry skin  Toe Exam: ROM and strength within normal limits  Sensory   Monofilament testing: intact    Vascular  Capillary refills: < 3 seconds  The left DP pulse is 2+  The left PT pulse is 2+       Assign Risk Category  No deformity present  No loss of protective sensation  No weak pulses  Risk: 0

## 2022-01-07 NOTE — PROGRESS NOTES
Assessment/Plan:    Blood pressure stable  Up-to-date with Nephrology  CKD stable  A1c 6 8  LDL cholesterol nicely below 100  Risk factor modification  Recommend yearly diabetic eye and foot examination  COVID vaccines up-to-date  Flu shot up-to-date  Recheck 6 months with repeat labs     Diagnoses and all orders for this visit:    Type 2 diabetes mellitus without complication, without long-term current use of insulin (HCC)  -     Hemoglobin A1C; Future    Pure hypercholesterolemia  -     Hepatic function panel; Future  -     Lipid panel; Future  -     Microalbumin / creatinine urine ratio    Essential hypertension    Chronic renal impairment, stage 3 (moderate), unspecified whether stage 3a or 3b CKD (HCC)    Prostate cancer screening  -     PSA, Total Screen; Future        1  Type 2 diabetes mellitus without complication, without long-term current use of insulin (HCC)  Hemoglobin A1C   2  Pure hypercholesterolemia  Hepatic function panel    Lipid panel    Microalbumin / creatinine urine ratio   3  Essential hypertension     4  Chronic renal impairment, stage 3 (moderate), unspecified whether stage 3a or 3b CKD ([facility] [address] )     5  Prostate cancer screening  PSA, Total Screen       Subjective:        Patient ID: Dominique Corbin is a 61 y o  male  Chief Complaint   Patient presents with    Follow-up       Blood pressure, lab check  Overall well  No new complaint  The following portions of the patient's history were reviewed and updated as appropriate: past medical history, past surgical history and problem list       Review of Systems   Constitutional: Negative for fatigue  Eyes: Negative for visual disturbance  Respiratory: Negative for cough and shortness of breath  Cardiovascular: Negative for chest pain, palpitations and leg swelling  Gastrointestinal: Negative for abdominal pain  Neurological: Negative for dizziness and headaches  Psychiatric/Behavioral: The patient is not nervous/anxious  Objective:  /90 (BP Location: Left arm, Patient Position: Sitting, Cuff Size: Adult)   Pulse 92   Temp 97 6 °F (36 4 °C) (Temporal)   Resp 16   Ht 6' 2 5" (1 892 m)   Wt 92 2 kg (203 lb 3 2 oz)   SpO2 96%   BMI 25 74 kg/m²        Physical Exam  Vitals and nursing note reviewed  Constitutional:       General: He is not in acute distress  HENT:      Head: Normocephalic  Right Ear: Tympanic membrane normal       Left Ear: Tympanic membrane normal    Eyes:      General: No scleral icterus  Pupils: Pupils are equal, round, and reactive to light  Cardiovascular:      Heart sounds: Normal heart sounds  No murmur heard  Pulmonary:      Breath sounds: Normal breath sounds  Lymphadenopathy:      Cervical: No cervical adenopathy  Skin:     Coloration: Skin is not pale  Neurological:      Mental Status: He is alert and oriented to person, place, and time  Psychiatric:         Behavior: Behavior normal          Thought Content:  Thought content normal

## 2022-01-08 VITALS
RESPIRATION RATE: 16 BRPM | HEART RATE: 92 BPM | DIASTOLIC BLOOD PRESSURE: 84 MMHG | BODY MASS INDEX: 25.27 KG/M2 | SYSTOLIC BLOOD PRESSURE: 130 MMHG | TEMPERATURE: 97.6 F | OXYGEN SATURATION: 96 % | HEIGHT: 75 IN | WEIGHT: 203.2 LBS

## 2022-04-15 ENCOUNTER — OFFICE VISIT (OUTPATIENT)
Dept: FAMILY MEDICINE CLINIC | Facility: CLINIC | Age: 57
End: 2022-04-15
Payer: COMMERCIAL

## 2022-04-15 VITALS
BODY MASS INDEX: 25.31 KG/M2 | HEIGHT: 75 IN | DIASTOLIC BLOOD PRESSURE: 78 MMHG | TEMPERATURE: 97.8 F | WEIGHT: 203.6 LBS | SYSTOLIC BLOOD PRESSURE: 120 MMHG

## 2022-04-15 DIAGNOSIS — M54.42 CHRONIC BILATERAL LOW BACK PAIN WITH BILATERAL SCIATICA: Primary | ICD-10-CM

## 2022-04-15 DIAGNOSIS — G89.29 CHRONIC BILATERAL LOW BACK PAIN WITH BILATERAL SCIATICA: Primary | ICD-10-CM

## 2022-04-15 DIAGNOSIS — M54.41 CHRONIC BILATERAL LOW BACK PAIN WITH BILATERAL SCIATICA: Primary | ICD-10-CM

## 2022-04-15 PROCEDURE — 1036F TOBACCO NON-USER: CPT | Performed by: FAMILY MEDICINE

## 2022-04-15 PROCEDURE — 99213 OFFICE O/P EST LOW 20 MIN: CPT | Performed by: FAMILY MEDICINE

## 2022-04-15 PROCEDURE — 3008F BODY MASS INDEX DOCD: CPT | Performed by: FAMILY MEDICINE

## 2022-04-15 RX ORDER — PREDNISONE 10 MG/1
TABLET ORAL
Qty: 30 TABLET | Refills: 0 | Status: SHIPPED | OUTPATIENT
Start: 2022-04-15

## 2022-04-15 NOTE — ASSESSMENT & PLAN NOTE
Check x-rays, course of prednisone is warranted at this point  Based on his response to the medication and his x-rays will decide whether patient needs physical therapy at this point

## 2022-04-15 NOTE — PROGRESS NOTES
Assessment/Plan:    Chronic bilateral low back pain with bilateral sciatica  Check x-rays, course of prednisone is warranted at this point  Based on his response to the medication and his x-rays will decide whether patient needs physical therapy at this point  Diagnoses and all orders for this visit:    Chronic bilateral low back pain with bilateral sciatica  -     predniSONE 10 mg tablet; 5 x 2 days, 4 x 2 days, 3 x 2 days,2 x 2 days, 1 x 2 days  -     XR spine lumbar minimum 4 views non injury; Future          Subjective:   Chief Complaint   Patient presents with    Neck Pain    Leg Pain     B/L  x wks           Patient ID: Josette Pallas is a 61 y o  male  He is complaining of low back pain radiating to both his legs for weeks  He has had issues with this in the past multiple times over the years  He does not recall ever having a single traumatic event  He denies any recent overuse  It is much worse if he sits for a while and gets up  He is also complaining of neck pain that is a more recent issue  He has trouble looking both ways not a stop sign or traffic light  The following portions of the patient's history were reviewed and updated as appropriate: allergies, current medications, past family history, past medical history, past social history, past surgical history and problem list     Review of Systems   Constitutional: Negative for chills and fever  HENT: Negative for ear pain and sore throat  Eyes: Negative for pain and visual disturbance  Respiratory: Negative for cough and shortness of breath  Cardiovascular: Negative for chest pain and palpitations  Gastrointestinal: Negative for abdominal pain and vomiting  Genitourinary: Negative for dysuria and hematuria  Musculoskeletal: Positive for back pain, neck pain and neck stiffness  Negative for arthralgias  Skin: Negative for color change and rash  Neurological: Negative for seizures and syncope     All other systems reviewed and are negative  Objective:      /78   Temp 97 8 °F (36 6 °C)   Ht 6' 2 5" (1 892 m)   Wt 92 4 kg (203 lb 9 6 oz)   BMI 25 79 kg/m²          Physical Exam  Vitals and nursing note reviewed  Constitutional:       General: He is not in acute distress  Appearance: He is well-developed  HENT:      Head: Normocephalic and atraumatic  Eyes:      Extraocular Movements: Extraocular movements intact  Conjunctiva/sclera: Conjunctivae normal    Cardiovascular:      Rate and Rhythm: Normal rate and regular rhythm  Pulses: Normal pulses  Heart sounds: Normal heart sounds  Pulmonary:      Effort: Pulmonary effort is normal       Breath sounds: Normal breath sounds  Abdominal:      General: Abdomen is flat  Palpations: Abdomen is soft  Musculoskeletal:      Cervical back: Tenderness present  Decreased range of motion  Lumbar back: Tenderness present  Decreased range of motion  Positive right straight leg raise test and positive left straight leg raise test    Neurological:      Mental Status: He is alert and oriented to person, place, and time     Psychiatric:         Judgment: Judgment normal

## 2022-04-18 ENCOUNTER — TELEPHONE (OUTPATIENT)
Dept: FAMILY MEDICINE CLINIC | Facility: CLINIC | Age: 57
End: 2022-04-18

## 2022-04-18 NOTE — TELEPHONE ENCOUNTER
I called pt he is aware of your recommendations and his x ray results  Pt will comeplte prednisone and call us back with an update on how he is doing

## 2022-04-18 NOTE — TELEPHONE ENCOUNTER
Bassam Jackson called the office he went for his lumbar x ray on 04/15/22 at [facility]  Pt is requesting results  They are in care everywhere     Pt's number is [phone]

## 2022-04-21 ENCOUNTER — TELEPHONE (OUTPATIENT)
Dept: FAMILY MEDICINE CLINIC | Facility: CLINIC | Age: 57
End: 2022-04-21

## 2022-04-21 DIAGNOSIS — M54.41 CHRONIC BILATERAL LOW BACK PAIN WITH BILATERAL SCIATICA: Primary | ICD-10-CM

## 2022-04-21 DIAGNOSIS — M54.42 CHRONIC BILATERAL LOW BACK PAIN WITH BILATERAL SCIATICA: Primary | ICD-10-CM

## 2022-04-21 DIAGNOSIS — G89.29 CHRONIC BILATERAL LOW BACK PAIN WITH BILATERAL SCIATICA: Primary | ICD-10-CM

## 2022-04-21 NOTE — TELEPHONE ENCOUNTER
Order on printer for physical therapy  Do not know the number for [facility]    He will have to research this

## 2022-04-21 NOTE — TELEPHONE ENCOUNTER
Patient is aware  He is going to call and schedule   He will give us fax number so we can send amb referral

## 2022-04-21 NOTE — TELEPHONE ENCOUNTER
Patient called today - he saw Dr Mauro Laguerre for some back pain and there was a mention of patient getting physical therapy  He is wondering if there can please be an order/referral placed for the patient to see physical therapy  He will probably go to [facility] provider as they cover his insurance   Can you please place referral?

## 2022-05-24 ENCOUNTER — TELEPHONE (OUTPATIENT)
Dept: FAMILY MEDICINE CLINIC | Facility: CLINIC | Age: 57
End: 2022-05-24

## 2022-05-24 NOTE — TELEPHONE ENCOUNTER
Tell the patient would not recommend as the 2nd booster only seems to provide about 4 weeks of immunity    [facility] current policy at this point is not to recommended and weight more to the fall to see the next step

## 2022-06-11 DIAGNOSIS — E78.00 PURE HYPERCHOLESTEROLEMIA: ICD-10-CM

## 2022-06-13 RX ORDER — ATORVASTATIN CALCIUM 20 MG/1
TABLET, FILM COATED ORAL
Qty: 90 TABLET | Refills: 3 | Status: SHIPPED | OUTPATIENT
Start: 2022-06-13

## 2022-06-14 NOTE — TELEPHONE ENCOUNTER
Pt called this morning to check the status of medication refill  I made him aware it was sent yesterday  He verbalized understanding

## 2022-06-16 ENCOUNTER — OFFICE VISIT (OUTPATIENT)
Dept: URGENT CARE | Facility: MEDICAL CENTER | Age: 57
End: 2022-06-16
Payer: COMMERCIAL

## 2022-06-16 VITALS
OXYGEN SATURATION: 96 % | SYSTOLIC BLOOD PRESSURE: 120 MMHG | TEMPERATURE: 96.9 F | DIASTOLIC BLOOD PRESSURE: 81 MMHG | HEART RATE: 74 BPM | RESPIRATION RATE: 16 BRPM

## 2022-06-16 DIAGNOSIS — H81.13 BENIGN PAROXYSMAL POSITIONAL VERTIGO DUE TO BILATERAL VESTIBULAR DISORDER: Primary | ICD-10-CM

## 2022-06-16 PROCEDURE — G0382 LEV 3 HOSP TYPE B ED VISIT: HCPCS

## 2022-06-16 RX ORDER — MECLIZINE HYDROCHLORIDE 25 MG/1
25 TABLET ORAL ONCE
Status: DISCONTINUED | OUTPATIENT
Start: 2022-06-16 | End: 2022-06-16

## 2022-06-16 RX ORDER — MECLIZINE HYDROCHLORIDE 25 MG/1
25 TABLET ORAL EVERY 8 HOURS PRN
Qty: 30 TABLET | Refills: 0 | Status: SHIPPED | OUTPATIENT
Start: 2022-06-16

## 2022-06-16 NOTE — PROGRESS NOTES
[facility]        NAME: Krystyna Bojorquez is a 61 y o  male  : 1965    MRN: [id number]  DATE: 2022  TIME: 9:55 AM    Assessment and Plan   Benign paroxysmal positional vertigo due to bilateral vestibular disorder [H81 13]  1  Benign paroxysmal positional vertigo due to bilateral vestibular disorder  meclizine (ANTIVERT) 25 mg tablet    DISCONTINUED: meclizine (ANTIVERT) tablet 25 mg     Patient presents with complaints of intermittent dizziness with positional changes that has worsened since yesterday  He was at the gym yesterday  History of vertigo  Denies n/v  Denies syncope or head trauma /     Assessment notes dizziness with positional changes  Especially when going from sitting to laying down or sudden movements  Neuro intact  No numbness or tingling  Consider vertigo  Will order Meclizine and advised to F/U with PCP as needed  Proceed to ER if new/concerning symptoms  Patient Instructions     Take Meclizine as porescribed  Follow up with PCP as ordered  Proceed to  ER if symptoms worsen  Chief Complaint     Chief Complaint   Patient presents with    Dizziness     Patient relates "I was at the gym yesterday doing these exercises I have been doing for months  When I lay down the room started to spinning  I went home and rested  Today walked for 20 minutes and did the laying exercises again and room was spinning " Denies chest pain and SOB  Hx  Of vertigo x1 year ago had an episode  Denies nausea and vomiting  Patient states he went swimming yesterday not sure if this has something to do with dizziness  Attempted orthostatics BP and patient is not able to lay down          History of Present Illness       Patient presents with complaints of intermittent dizziness with positional changes that has worsened since yesterday  He was at the gym yesterday  History of vertigo  Denies n/v   Denies syncope or head trauma /       Review of Systems   Review of Systems   Constitutional: Negative for chills and fever  HENT: Negative for congestion, ear pain, postnasal drip, sinus pain and sore throat  Eyes: Negative for pain and itching  Respiratory: Negative for cough, shortness of breath and wheezing  Cardiovascular: Negative for chest pain and palpitations  Gastrointestinal: Negative for abdominal pain, constipation, diarrhea, nausea and vomiting  Genitourinary: Negative for difficulty urinating and hematuria  Musculoskeletal: Negative for arthralgias and myalgias  Skin: Negative for rash  Neurological: Positive for dizziness  Negative for syncope, light-headedness, numbness and headaches  Psychiatric/Behavioral: Negative for agitation and sleep disturbance  The patient is not nervous/anxious            Current Medications       Current Outpatient Medications:     atorvastatin (LIPITOR) 20 mg tablet, TAKE 1 TABLET DAILY, Disp: 90 tablet, Rfl: 3    Cholecalciferol (VITAMIN D3) 50 MCG (2000 UT) TABS, Take 2,000 Units by mouth daily, Disp: , Rfl:     clonazePAM (KlonoPIN) 0 5 mg tablet, Take by mouth TAKEN AS NEEDED , Disp: , Rfl:     Januvia 25 MG tablet, TAKE 1 TABLET DAILY, Disp: 90 tablet, Rfl: 3    ketoconazole (NIZORAL) 2 % cream, APPLY TOPICALLY TO AFFECTED AREA(S) EVERY DAY, Disp: , Rfl: 2    losartan (COZAAR) 25 mg tablet, Take 1 tablet by mouth daily, Disp: , Rfl:     magnesium cl-calcium carbonate (MAG-DELAY)  MG tablet, Take by mouth, Disp: , Rfl:     meclizine (ANTIVERT) 25 mg tablet, Take 1 tablet (25 mg total) by mouth every 8 (eight) hours as needed for dizziness, Disp: 30 tablet, Rfl: 0    multivitamin (THERAGRAN) TABS, Take 1 tablet by mouth, Disp: , Rfl:     propranolol (INDERAL LA) 60 mg 24 hr capsule, , Disp: , Rfl:     acetaminophen (TYLENOL) 500 mg tablet, Take 500 mg by mouth every 6 (six) hours as needed (Patient not taking: No sig reported), Disp: , Rfl:     glucose blood test strip, by In Vitro route 2 (two) times a day (Patient not taking: Reported on 6/16/2022), Disp: , Rfl:     Lancets (ONETOUCH ULTRASOFT) lancets, by Does not apply route (Patient not taking: Reported on 6/16/2022), Disp: , Rfl:     predniSONE 10 mg tablet, 5 x 2 days, 4 x 2 days, 3 x 2 days,2 x 2 days, 1 x 2 days  (Patient not taking: Reported on 6/16/2022), Disp: 30 tablet, Rfl: 0    Current Allergies     Allergies as of 06/16/2022    (No Known Allergies)            The following portions of the patient's history were reviewed and updated as appropriate: allergies, current medications, past family history, past medical history, past social history, past surgical history and problem list      Past Medical History:   Diagnosis Date    Chronic kidney disease (CKD), stage IV (severe) ([facility] [address] )     last assessed 6/21/16    Concussion      post concussion syndrome, last assessed 9/10/15    Elevated ALT measurement     last assessed 6/25/13    Hematuria     last assessed 7/2/12    Radial head fracture     last assessed 8/27/15       Past Surgical History:   Procedure Laterality Date    COLONOSCOPY      [facility] 2015 - 2020    ERCP      ESOPHAGOGASTRODUODENOSCOPY      diagnostic    HERNIA REPAIR      LIVER BIOPSY      1/96, showed very mild increase in hepatocellular granular iron  5/98 no inflammation, slight liver unrest patchy distribution of stainable hepatocytic iron, more consistant with lipofuscion pigment, no fibrosis noted  9/07 show mild portal fibrosis and moderate stainable iron deposits within hepatocytes two out of four        Family History   Problem Relation Age of Onset    Hyperlipidemia Mother     Lymphoma Sister         non hodgkin's    Multiple sclerosis Sister          Medications have been verified  Objective   /81   Pulse 74   Temp (!) 96 9 °F (36 1 °C) (Tympanic)   Resp 16   SpO2 96%   No LMP for male patient  Physical Exam     Physical Exam  Vitals reviewed  Constitutional:       General: He is not in acute distress  Appearance: Normal appearance  He is not ill-appearing  HENT:      Head: Normocephalic and atraumatic  Eyes:      Extraocular Movements: Extraocular movements intact  Conjunctiva/sclera: Conjunctivae normal    Pulmonary:      Effort: Pulmonary effort is normal    Skin:     General: Skin is warm  Neurological:      General: No focal deficit present  Mental Status: He is alert and oriented to person, place, and time  Mental status is at baseline  Sensory: No sensory deficit  Motor: No weakness        Gait: Gait normal    Psychiatric:         Mood and Affect: Mood normal          Behavior: Behavior normal          Judgment: Judgment normal

## 2022-06-17 ENCOUNTER — TELEPHONE (OUTPATIENT)
Dept: FAMILY MEDICINE CLINIC | Facility: CLINIC | Age: 57
End: 2022-06-17

## 2022-06-17 NOTE — TELEPHONE ENCOUNTER
Tell the patient it will take more than a number of doses to help this as vertigo can usually last for a number of days  He needs to take it easy hydrate and move positions very slowly    Update us early next week and if not improving may need a short course of steroids

## 2022-06-17 NOTE — TELEPHONE ENCOUNTER
Pt went to [facility] urgent care yesterday for vertigo/dizzines for 2 days constantly  Pt was prescribed meclizine  Pt has only taken it once yesterday evening at 7 pm  Pt was off this morning had trouble getting out of bed  When pt lays down is when the dizziness/ vertigo occurs  Normal activities like sitting when med wears off he just has a little headache  Pt was asking does it take a  few doses to get  Relief? Pt wants to make sure you aware he is taking this medication     Pr's number is [phone]

## 2022-07-05 LAB
CREAT ?TM UR-SCNC: 207 UMOL/L
EXT MICROALBUMIN URINE RANDOM: 6.8
HBA1C MFR BLD HPLC: 7.4 %
MICROALBUMIN/CREAT UR: 32.9 MG/G{CREAT}

## 2022-07-05 PROCEDURE — 3060F POS MICROALBUMINURIA REV: CPT | Performed by: FAMILY MEDICINE

## 2022-07-27 ENCOUNTER — OFFICE VISIT (OUTPATIENT)
Dept: FAMILY MEDICINE CLINIC | Facility: CLINIC | Age: 57
End: 2022-07-27
Payer: COMMERCIAL

## 2022-07-27 VITALS
DIASTOLIC BLOOD PRESSURE: 82 MMHG | TEMPERATURE: 97.9 F | HEIGHT: 75 IN | BODY MASS INDEX: 25.27 KG/M2 | SYSTOLIC BLOOD PRESSURE: 120 MMHG | WEIGHT: 203.2 LBS

## 2022-07-27 DIAGNOSIS — N18.32 STAGE 3B CHRONIC KIDNEY DISEASE (HCC): ICD-10-CM

## 2022-07-27 DIAGNOSIS — E11.9 TYPE 2 DIABETES MELLITUS WITHOUT COMPLICATION, WITHOUT LONG-TERM CURRENT USE OF INSULIN (HCC): Primary | ICD-10-CM

## 2022-07-27 DIAGNOSIS — I10 ESSENTIAL HYPERTENSION: ICD-10-CM

## 2022-07-27 DIAGNOSIS — E78.00 PURE HYPERCHOLESTEROLEMIA: ICD-10-CM

## 2022-07-27 PROBLEM — E83.42 HYPOMAGNESEMIA: Status: ACTIVE | Noted: 2019-10-15

## 2022-07-27 PROCEDURE — 99213 OFFICE O/P EST LOW 20 MIN: CPT | Performed by: FAMILY MEDICINE

## 2022-07-27 PROCEDURE — 3074F SYST BP LT 130 MM HG: CPT | Performed by: FAMILY MEDICINE

## 2022-07-27 PROCEDURE — 3079F DIAST BP 80-89 MM HG: CPT | Performed by: FAMILY MEDICINE

## 2022-07-28 NOTE — PROGRESS NOTES
Assessment/Plan:    Overall stable  CKD stable  A1c less than 7  Recheck q 6 months  Recommend yearly diabetic eye and foot examination     Diagnoses and all orders for this visit:    Encounter for immunization    Pure hypercholesterolemia  -     Hemoglobin A1C; Future  -     Lipid Panel with Direct LDL reflex; Future  -     CT coronary calcium score; Future    Type 2 diabetes mellitus without complication, without long-term current use of insulin (HCC)  -     Hemoglobin A1C; Future        1  Encounter for immunization     2  Pure hypercholesterolemia  Hemoglobin A1C    Lipid Panel with Direct LDL reflex    CT coronary calcium score   3  Type 2 diabetes mellitus without complication, without long-term current use of insulin (HCC)  Hemoglobin A1C       Subjective:        Patient ID: Izzy Sierra is a 61 y o  male  Chief Complaint   Patient presents with    Diabetes    Hypertension    Hyperlipidemia     No refills needed  Review blood work        Six-month recheck  Follows up with Nephrology regularly  The following portions of the patient's history were reviewed and updated as appropriate: past medical history, past surgical history and problem list       Review of Systems   Constitutional: Negative for fatigue  Eyes: Negative for visual disturbance  Respiratory: Negative for cough and shortness of breath  Cardiovascular: Negative for chest pain, palpitations and leg swelling  Gastrointestinal: Negative for abdominal pain  Neurological: Negative for dizziness and headaches  Psychiatric/Behavioral: The patient is not nervous/anxious  Objective:  /82   Temp 97 9 °F (36 6 °C)   Ht 6' 2 5" (1 892 m)   Wt 92 2 kg (203 lb 3 2 oz)   BMI 25 74 kg/m²        Physical Exam  Vitals and nursing note reviewed  Constitutional:       General: He is not in acute distress  HENT:      Head: Normocephalic  Eyes:      General: No scleral icterus       Pupils: Pupils are equal, round, and reactive to light  Cardiovascular:      Rate and Rhythm: Normal rate and regular rhythm  Heart sounds: Normal heart sounds  No murmur heard  Pulmonary:      Breath sounds: Normal breath sounds  Musculoskeletal:      Right lower leg: No edema  Left lower leg: No edema  Lymphadenopathy:      Cervical: No cervical adenopathy  Skin:     Coloration: Skin is not pale  Neurological:      General: No focal deficit present  Mental Status: He is alert and oriented to person, place, and time  Psychiatric:         Behavior: Behavior normal          Thought Content:  Thought content normal

## 2022-10-03 ENCOUNTER — TELEPHONE (OUTPATIENT)
Dept: FAMILY MEDICINE CLINIC | Facility: CLINIC | Age: 57
End: 2022-10-03

## 2022-10-17 ENCOUNTER — TELEMEDICINE (OUTPATIENT)
Dept: FAMILY MEDICINE CLINIC | Facility: CLINIC | Age: 57
End: 2022-10-17
Payer: COMMERCIAL

## 2022-10-17 DIAGNOSIS — B34.9 VIRAL INFECTION, UNSPECIFIED: Primary | ICD-10-CM

## 2022-10-17 PROCEDURE — 99213 OFFICE O/P EST LOW 20 MIN: CPT | Performed by: FAMILY MEDICINE

## 2022-10-17 NOTE — PROGRESS NOTES
COVID-19 Outpatient Progress Note    Assessment/Plan:    Problem List Items Addressed This Visit    None     Visit Diagnoses     Viral infection, unspecified    -  Primary    Relevant Orders    Covid/Flu- Mobile [facility] or [facility]         Disposition:     Referred patient to centralized site to test for COVID-19/Influenza  While awaiting the results of covid testing, patient was advised to isolate  Discussed symptom directed medication options with patient  Discussed vitamin D, vitamin C, and/or zinc supplementation with patient  [address]    Your healthcare provider and/or public health staff have evaluated you and have determined that you do not need to remain in the hospital at this time   At this time you can be isolated at home where you will be monitored by staff from your local or state health department  You should carefully follow the prevention and isolation steps below until a healthcare provider or local or state health department says that you can return to your normal activities  Stay home except to get medical care    People who are mildly ill with COVID-19 are able to isolate at home during their illness  You should restrict activities outside your home, except for getting medical care  Do not go to work, school, or public areas  Avoid using public transportation, ride-sharing, or taxis  Separate yourself from other people and animals in your home    People: As much as possible, you should stay in a specific room and away from other people in your home  Also, you should use a separate bathroom, if available  Animals: You should restrict contact with pets and other animals while you are sick with COVID-19, just like you would around other people   Although there have not been reports of pets or other animals becoming sick with COVID-19, it is still recommended that people sick with COVID-19 limit contact with animals until more information is known about the virus  When possible, have another member of your household care for your animals while you are sick  If you are sick with COVID-19, avoid contact with your pet, including petting, snuggling, being kissed or licked, and sharing food  If you must care for your pet or be around animals while you are sick, wash your hands before and after you interact with pets and wear a facemask  See COVID-19 and Animals for more information  Call ahead before visiting your doctor    If you have a medical appointment, call the healthcare provider and tell them that you have or may have COVID-19  This will help the healthcare provider’s office take steps to keep other people from getting infected or exposed  Wear a facemask    You should wear a facemask when you are around other people (e g , sharing a room or vehicle) or pets and before you enter a healthcare provider’s office  If you are not able to wear a facemask (for example, because it causes trouble breathing), then people who live with you should not stay in the same room with you, or they should wear a facemask if they enter your room  Cover your coughs and sneezes    Cover your mouth and nose with a tissue when you cough or sneeze  Throw used tissues in a lined trash can  Immediately wash your hands with soap and water for at least 20 seconds or, if soap and water are not available, clean your hands with an alcohol-based hand  that contains at least 60% alcohol  Clean your hands often    Wash your hands often with soap and water for at least 20 seconds, especially after blowing your nose, coughing, or sneezing; going to the bathroom; and before eating or preparing food  If soap and water are not readily available, use an alcohol-based hand  with at least 60% alcohol, covering all surfaces of your hands and rubbing them together until they feel dry  Soap and water are the best option if hands are visibly dirty   Avoid touching your eyes, nose, and mouth with unwashed hands  Avoid sharing personal household items    You should not share dishes, drinking glasses, cups, eating utensils, towels, or bedding with other people or pets in your home  After using these items, they should be washed thoroughly with soap and water  Clean all “high-touch” surfaces everyday    High touch surfaces include counters, tabletops, doorknobs, bathroom fixtures, toilets, phones, keyboards, tablets, and bedside tables  Also, clean any surfaces that may have blood, stool, or body fluids on them  Use a household cleaning spray or wipe, according to the label instructions  Labels contain instructions for safe and effective use of the cleaning product including precautions you should take when applying the product, such as wearing gloves and making sure you have good ventilation during use of the product  Monitor your symptoms    Seek prompt medical attention if your illness is worsening (e g , difficulty breathing)  Before seeking care, call your healthcare provider and tell them that you have, or are being evaluated for, COVID-19  Put on a facemask before you enter the facility  These steps will help the healthcare provider’s office to keep other people in the office or waiting room from getting infected or exposed  Ask your healthcare provider to call the local or [facility] health department  Persons who are placed under active monitoring or facilitated self-monitoring should follow instructions provided by their local health department or occupational health professionals, as appropriate  If you have a medical emergency and need to call 911, notify the dispatch personnel that you have, or are being evaluated for COVID-19  If possible, put on a facemask before emergency medical services arrive  Discontinuing home isolation    Patients with confirmed COVID-19 should remain under home isolation precautions until the following conditions are met:    They have had no fever for at least 24 hours (that is one full day of no fever without the use medicine that reduces fevers)  AND  other symptoms have improved (for example, when their cough or shortness of breath have improved)  AND  If had mild or moderate illness, at least 10 days have passed since their symptoms first appeared or if severe illness (needed oxygen) or immunosuppressed, at least 20 days have passed since symptoms first appeared  Patients with confirmed COVID-19 should also notify close contacts (including their workplace) and ask that they self-quarantine  Currently, close contact is defined as being within 6 feet for 15 minutes or more from the period 24 hours starting 48 hours before symptom onset to the time at which the patient went into isolation   Close contacts of patients diagnosed with COVID-19 should be instructed by the patient to self-quarantine for 14 days from the last time of their last contact with the patient  Source: [employer]      I have spent 10 minutes directly with the patient  Greater than 50% of this time was spent in counseling/coordination of care regarding: prognosis, risks and benefits of treatment options, instructions for management, patient and family education and importance of treatment compliance  Encounter provider: Sylvia Caal DO     Provider located at: [address] [place] [address] Ne PRIMARY CARE  [address] [facility] [address]  [facility] [id number] [phone]     Recent Visits  No visits were found meeting these conditions  Showing recent visits within past 7 days and meeting all other requirements  Today's Visits  Date Type Provider Dept   10/17/22 [address], [address] Primary Care   Showing today's visits and meeting all other requirements  Future Appointments  No visits were found meeting these conditions    Showing future appointments within next 150 days and meeting all other requirements       Patient agrees to participate in a virtual check in via telephone or video visit instead of presenting to the office to address urgent/immediate medical needs  Patient is aware this is a billable service  He acknowledged consent and understanding of privacy and security of the video platform  The patient has agreed to participate and understands they can discontinue the visit at any time  After connecting through [facility], the patient was identified by name and date of birth  Monster Oshea was informed that this was a telemedicine visit and that the exam was being conducted confidentially over secure lines  Monster Oshea acknowledged consent and understanding of privacy and security of the telemedicine visit  I informed the patient that I have reviewed his record in Epic and presented the opportunity for him to ask any questions regarding the visit today  The patient agreed to participate  Verification of patient location:  Patient is located in the following state in which I hold an active license: [place]    Subjective:   Monster Oshea is a 61 y o  male who is concerned about COVID-19  Patient's symptoms include chills, fatigue, malaise, cough and headache  Patient denies congestion, rhinorrhea, sore throat, anosmia, loss of taste, shortness of breath, chest tightness, abdominal pain, nausea, vomiting, diarrhea and myalgias       - Date of symptom onset: 10/16/2022      COVID-19 vaccination status: Fully vaccinated with booster    Exposure:   Contact with a person who is under investigation (PUI) for or who is positive for COVID-19 within the last 14 days?: No    Hospitalized recently for fever and/or lower respiratory symptoms?: No      Currently a healthcare worker that is involved in direct patient care?: No      Works in a special setting where the risk of COVID-19 transmission may be high? (this may include long-term care, correctional and care home facilities; homeless shelters; assisted-living facilities and group homes ): No      Resident in a special setting where the risk of COVID-19 transmission may be high? (this may include long-term care, correctional and FDC facilities; homeless shelters; assisted-living facilities and group homes ): No      Works from home, was out with some people on Friday night  Lab Results   Component Value Date    SARSCOV2 Not Detected 01/07/2021       Review of Systems   Constitutional: Positive for chills and fatigue  Negative for activity change and appetite change  HENT: Negative for congestion, rhinorrhea and sore throat  Respiratory: Positive for cough  Negative for chest tightness and shortness of breath  Gastrointestinal: Negative for abdominal pain, diarrhea, nausea and vomiting  Musculoskeletal: Negative for myalgias  Neurological: Positive for headaches  Current Outpatient Medications on File Prior to Visit   Medication Sig   • atorvastatin (LIPITOR) 20 mg tablet TAKE 1 TABLET DAILY   • Cholecalciferol (VITAMIN D3) 50 MCG (2000 UT) TABS Take 2,000 Units by mouth daily   • Januvia 25 MG tablet TAKE 1 TABLET DAILY   • ketoconazole (NIZORAL) 2 % cream APPLY TOPICALLY TO AFFECTED AREA(S) EVERY DAY   • losartan (COZAAR) 25 mg tablet Take 1 tablet by mouth daily   • magnesium cl-calcium carbonate (MAG-DELAY)  MG tablet Take by mouth   • meclizine (ANTIVERT) 25 mg tablet Take 1 tablet (25 mg total) by mouth every 8 (eight) hours as needed for dizziness   • multivitamin (THERAGRAN) TABS Take 1 tablet by mouth   • predniSONE 10 mg tablet 5 x 2 days, 4 x 2 days, 3 x 2 days,2 x 2 days, 1 x 2 days   (Patient not taking: No sig reported)   • propranolol (INDERAL LA) 60 mg 24 hr capsule    • [DISCONTINUED] acetaminophen (TYLENOL) 500 mg tablet Take 500 mg by mouth every 6 (six) hours as needed (Patient not taking: No sig reported)   • [DISCONTINUED] clonazePAM (KlonoPIN) 0 5 mg tablet Take by mouth TAKEN AS NEEDED  (Patient not taking: Reported on 7/27/2022)   • [DISCONTINUED] glucose blood test strip by In Vitro route 2 (two) times a day (Patient not taking: No sig reported)   • [DISCONTINUED] Lancets (ONETOUCH ULTRASOFT) lancets by Does not apply route (Patient not taking: No sig reported)       Objective: There were no vitals taken for this visit  Physical Exam  Vitals and nursing note reviewed  Constitutional:       Appearance: He is well-developed  HENT:      Head: Normocephalic and atraumatic  Eyes:      Conjunctiva/sclera: Conjunctivae normal    Pulmonary:      Effort: Pulmonary effort is normal    Neurological:      Mental Status: He is alert and oriented to person, place, and time     Psychiatric:         Judgment: Judgment normal        Holly Romero DO

## 2022-10-18 PROCEDURE — 87636 SARSCOV2 & INF A&B AMP PRB: CPT | Performed by: FAMILY MEDICINE

## 2022-10-19 LAB
FLUAV RNA RESP QL NAA+PROBE: NEGATIVE
FLUBV RNA RESP QL NAA+PROBE: NEGATIVE
SARS-COV-2 RNA RESP QL NAA+PROBE: POSITIVE

## 2022-12-13 ENCOUNTER — HOSPITAL ENCOUNTER (OUTPATIENT)
Dept: CT IMAGING | Facility: HOSPITAL | Age: 57
Discharge: HOME | End: 2022-12-13

## 2022-12-13 DIAGNOSIS — E78.00 PURE HYPERCHOLESTEROLEMIA: ICD-10-CM

## 2022-12-21 NOTE — TELEPHONE ENCOUNTER
Dr. Woo just to clarify, he should stop both medications for 3 days and resume the nystatin swish and spit again, if he develops a rash again to discontinue it. He should not resume the clotrimazole  shanna. Is this correct?   LISANDRA note completed

## 2023-02-20 ENCOUNTER — RA CDI HCC (OUTPATIENT)
Dept: OTHER | Facility: HOSPITAL | Age: 58
End: 2023-02-20

## 2023-02-20 LAB — HBA1C MFR BLD HPLC: 7.3 %

## 2023-02-20 NOTE — PROGRESS NOTES
[facility]  coding opportunities          Chart Reviewed number of suggestions sent to Provider: 2  E11 22  E11 36     Patients Insurance        Commercial Insurance: [employer]

## 2023-02-27 ENCOUNTER — OFFICE VISIT (OUTPATIENT)
Dept: FAMILY MEDICINE CLINIC | Facility: CLINIC | Age: 58
End: 2023-02-27

## 2023-02-27 VITALS
WEIGHT: 206 LBS | HEIGHT: 75 IN | BODY MASS INDEX: 25.61 KG/M2 | SYSTOLIC BLOOD PRESSURE: 124 MMHG | DIASTOLIC BLOOD PRESSURE: 82 MMHG

## 2023-02-27 DIAGNOSIS — E78.2 MIXED HYPERLIPIDEMIA: ICD-10-CM

## 2023-02-27 DIAGNOSIS — N18.32 TYPE 2 DIABETES MELLITUS WITH STAGE 3B CHRONIC KIDNEY DISEASE, WITHOUT LONG-TERM CURRENT USE OF INSULIN (HCC): Primary | ICD-10-CM

## 2023-02-27 DIAGNOSIS — E11.36: ICD-10-CM

## 2023-02-27 DIAGNOSIS — E11.22 TYPE 2 DIABETES MELLITUS WITH STAGE 3B CHRONIC KIDNEY DISEASE, WITHOUT LONG-TERM CURRENT USE OF INSULIN (HCC): Primary | ICD-10-CM

## 2023-02-27 DIAGNOSIS — I10 ESSENTIAL HYPERTENSION: ICD-10-CM

## 2023-02-27 DIAGNOSIS — N25.81 HYPERPARATHYROIDISM DUE TO RENAL INSUFFICIENCY (HCC): ICD-10-CM

## 2023-02-27 DIAGNOSIS — N18.32 STAGE 3B CHRONIC KIDNEY DISEASE (HCC): ICD-10-CM

## 2023-02-27 DIAGNOSIS — Z12.5 PROSTATE CANCER SCREENING: ICD-10-CM

## 2023-03-02 ENCOUNTER — TELEPHONE (OUTPATIENT)
Dept: FAMILY MEDICINE CLINIC | Facility: CLINIC | Age: 58
End: 2023-03-02

## 2023-03-02 NOTE — TELEPHONE ENCOUNTER
Pt called and stated he had an appt with his Kidney physician today and he was in agreement that he can increase his Januvia from 25 to 50 mg daily  He did also order him labs to complete to make sure there is no immediate change in his numbers

## 2023-03-03 ENCOUNTER — TELEPHONE (OUTPATIENT)
Dept: FAMILY MEDICINE CLINIC | Facility: CLINIC | Age: 58
End: 2023-03-03

## 2023-03-03 DIAGNOSIS — E11.9 TYPE 2 DIABETES MELLITUS WITHOUT COMPLICATION, WITHOUT LONG-TERM CURRENT USE OF INSULIN (HCC): ICD-10-CM

## 2023-03-03 NOTE — TELEPHONE ENCOUNTER
Tell the patient he should take Januvia 25 2 daily to equal 50 mg  When he is getting close to running out we will refill it for the 50 mg dose  If he can change it in his med list that would be great    He should get repeat BMP and A1c in 3 months which is previously ordered

## 2023-03-03 NOTE — PROGRESS NOTES
Assessment/Plan: Blood pressure and labs stable  Up-to-date with nephrology  A1c unfortunately is up  He will check with nephrology later this week to see if we can go to 50 mg of Januvia  If so recheck labs and 3 months  LDL less than 100  Continue statin  Recheck every 6 months  Recommend yearly diabetic eye and foot examination    No problem-specific Assessment & Plan notes found for this encounter  Diagnoses and all orders for this visit:    Type 2 diabetes mellitus with stage 3b chronic kidney disease, without long-term current use of insulin ([facility] [address] )  -     Basic metabolic panel; Future  -     Hemoglobin A1C; Future  -     Hemoglobin A1C; Future    Stage 3b chronic kidney disease (HCC)  -     Basic metabolic panel; Future    Essential hypertension    Mixed hyperlipidemia    Hyperparathyroidism due to renal insufficiency [facility])    Prostate cancer screening  -     PSA, Total Screen; Future    Diabetic cataract of both eyes ([address] )        1  Type 2 diabetes mellitus with stage 3b chronic kidney disease, without long-term current use of insulin ([facility])  Basic metabolic panel    Hemoglobin A1C    Hemoglobin A1C      2  Stage 3b chronic kidney disease (HCC)  Basic metabolic panel      3  Essential hypertension        4  Mixed hyperlipidemia        5  Hyperparathyroidism due to renal insufficiency ([address] )        6  Prostate cancer screening  PSA, Total Screen      7  Diabetic cataract of both eyes ([facility] [address] )            Subjective:        Patient ID: Arabella Kelly is a 61 y o  male  Chief Complaint   Patient presents with   • Diabetes   • Hypertension   • Hyperlipidemia       Routine diabetic and blood pressure recheck  Up-to-date with specialist sees nephrology later this week      The following portions of the patient's history were reviewed and updated as appropriate: past medical history, past surgical history and problem list       Review of Systems   Constitutional: Negative for fatigue     Eyes: Negative for visual disturbance  Respiratory: Negative for cough and shortness of breath  Cardiovascular: Negative for chest pain, palpitations and leg swelling  Gastrointestinal: Negative for abdominal pain  Neurological: Negative for dizziness and headaches  Psychiatric/Behavioral: The patient is not nervous/anxious  Objective:  /82   Ht 6' 2 5" (1 892 m)   Wt 93 4 kg (206 lb)   BMI 26 10 kg/m²        Physical Exam  Vitals and nursing note reviewed  Constitutional:       General: He is not in acute distress  HENT:      Head: Normocephalic  Eyes:      General: No scleral icterus  Pupils: Pupils are equal, round, and reactive to light  Cardiovascular:      Rate and Rhythm: Normal rate and regular rhythm  Heart sounds: Normal heart sounds  No murmur heard  Pulmonary:      Breath sounds: Normal breath sounds  Musculoskeletal:      Right lower leg: No edema  Left lower leg: No edema  Lymphadenopathy:      Cervical: No cervical adenopathy  Skin:     Coloration: Skin is not pale  Neurological:      General: No focal deficit present  Mental Status: He is alert and oriented to person, place, and time  Psychiatric:         Behavior: Behavior normal          Thought Content:  Thought content normal

## 2023-03-09 DIAGNOSIS — E11.9 TYPE 2 DIABETES MELLITUS WITHOUT COMPLICATION, WITHOUT LONG-TERM CURRENT USE OF INSULIN (HCC): ICD-10-CM

## 2023-04-07 ENCOUNTER — TELEPHONE (OUTPATIENT)
Dept: FAMILY MEDICINE CLINIC | Facility: CLINIC | Age: 58
End: 2023-04-07

## 2023-06-06 LAB — HBA1C MFR BLD HPLC: 7.7 %

## 2023-06-12 ENCOUNTER — TELEPHONE (OUTPATIENT)
Dept: FAMILY MEDICINE CLINIC | Facility: CLINIC | Age: 58
End: 2023-06-12

## 2023-06-12 DIAGNOSIS — E11.9 TYPE 2 DIABETES MELLITUS WITHOUT COMPLICATION, WITHOUT LONG-TERM CURRENT USE OF INSULIN (HCC): ICD-10-CM

## 2023-06-12 RX ORDER — SITAGLIPTIN 50 MG/1
TABLET, FILM COATED ORAL
Qty: 90 TABLET | Refills: 3 | Status: SHIPPED | OUTPATIENT
Start: 2023-06-12

## 2023-06-12 NOTE — TELEPHONE ENCOUNTER
Spoke to patient  Patient is aware  He is going to contact his kidney specialist regarding glimepiride

## 2023-06-14 ENCOUNTER — TELEPHONE (OUTPATIENT)
Dept: FAMILY MEDICINE CLINIC | Facility: CLINIC | Age: 58
End: 2023-06-14

## 2023-06-14 NOTE — TELEPHONE ENCOUNTER
Patient called stating he is concerned about his A1C numbers getting higher and would like to discuss this with Dr Mariama Clayton due to that he follows all recommendations and numbers are going up  Patient would also like Dr Mariama Clayton to review patient communication sent to Dr Mariama Clayton today of patients GFR  Results and please respond to patients concerns  Printed communication located in [address] [facility] tab received today for Dr Mariama Clayton to review with this message

## 2023-06-16 DIAGNOSIS — E11.9 TYPE 2 DIABETES MELLITUS WITHOUT COMPLICATION, WITHOUT LONG-TERM CURRENT USE OF INSULIN (HCC): Primary | ICD-10-CM

## 2023-08-07 ENCOUNTER — VBI (OUTPATIENT)
Dept: ADMINISTRATIVE | Facility: OTHER | Age: 58
End: 2023-08-07

## 2023-08-07 LAB
EXTERNAL CREATININE: 2.05
EXTERNAL EGFR: 37
HBA1C MFR BLD HPLC: 7.5 %
HBA1C MFR BLD HPLC: 7.5 %

## 2023-08-09 ENCOUNTER — TELEPHONE (OUTPATIENT)
Dept: FAMILY MEDICINE CLINIC | Facility: CLINIC | Age: 58
End: 2023-08-09

## 2023-08-09 NOTE — TELEPHONE ENCOUNTER
patient called and stated he had labs done on monday and would like to know if the results are back. He stated he also been having some stomach issues where he feels full all of the time even of he does not eat anything at all. He stated at times it can feel like he got punched I the stomach. He does have an appt for a colonoscopy in 2 weeks but he is unsure if he can have an EGD at that time as well. Please advise, thank you.

## 2023-08-15 ENCOUNTER — TELEPHONE (OUTPATIENT)
Dept: FAMILY MEDICINE CLINIC | Facility: CLINIC | Age: 58
End: 2023-08-15

## 2023-08-15 NOTE — TELEPHONE ENCOUNTER
Patient called and wanted to see what medications he's allowed to take and what medications he needs to stop prior to his procedure next Wednesday.

## 2023-08-17 DIAGNOSIS — N18.32 TYPE 2 DIABETES MELLITUS WITH STAGE 3B CHRONIC KIDNEY DISEASE, WITHOUT LONG-TERM CURRENT USE OF INSULIN (HCC): Primary | ICD-10-CM

## 2023-08-17 DIAGNOSIS — E11.22 TYPE 2 DIABETES MELLITUS WITH STAGE 3B CHRONIC KIDNEY DISEASE, WITHOUT LONG-TERM CURRENT USE OF INSULIN (HCC): Primary | ICD-10-CM

## 2023-08-21 ENCOUNTER — RA CDI HCC (OUTPATIENT)
Dept: OTHER | Facility: HOSPITAL | Age: 58
End: 2023-08-21

## 2023-08-21 NOTE — PROGRESS NOTES
[address] [facility] coding opportunities     **[facility] spreadsheet      Chart reviewed, no opportunity found: CHART REVIEWED, NO OPPORTUNITY FOUND        Patients Insurance        Commercial Insurance: [employer]

## 2023-09-12 ENCOUNTER — TELEPHONE (OUTPATIENT)
Dept: FAMILY MEDICINE CLINIC | Facility: CLINIC | Age: 58
End: 2023-09-12

## 2023-09-12 DIAGNOSIS — R39.198 SLOWING OF URINARY STREAM: Primary | ICD-10-CM

## 2023-09-12 NOTE — TELEPHONE ENCOUNTER
Patient received a bill for his labs done 8/7/2023, specifically for his screening PSA. [employer] does not cover screening PSA's. In order to reprocess need a diagnosis code to have this rebilled. This occurs every year. [facility] [id number], account #:  [de-identified].

## 2023-09-13 NOTE — TELEPHONE ENCOUNTER
Jimbo Young from [address] returned my call, provided the updated diagnosis code,she will process this out for insurance. Spoke to patient. Patient is aware.

## 2023-11-01 ENCOUNTER — TELEPHONE (OUTPATIENT)
Dept: FAMILY MEDICINE CLINIC | Facility: CLINIC | Age: 58
End: 2023-11-01

## 2023-11-02 DIAGNOSIS — E11.22 TYPE 2 DIABETES MELLITUS WITH STAGE 3B CHRONIC KIDNEY DISEASE, WITHOUT LONG-TERM CURRENT USE OF INSULIN (HCC): Primary | ICD-10-CM

## 2023-11-02 DIAGNOSIS — N18.32 TYPE 2 DIABETES MELLITUS WITH STAGE 3B CHRONIC KIDNEY DISEASE, WITHOUT LONG-TERM CURRENT USE OF INSULIN (HCC): Primary | ICD-10-CM

## 2023-11-07 LAB — HBA1C MFR BLD HPLC: 7.2 %

## 2023-11-13 ENCOUNTER — OFFICE VISIT (OUTPATIENT)
Dept: FAMILY MEDICINE CLINIC | Facility: CLINIC | Age: 58
End: 2023-11-13
Payer: COMMERCIAL

## 2023-11-13 VITALS
SYSTOLIC BLOOD PRESSURE: 124 MMHG | BODY MASS INDEX: 25.29 KG/M2 | DIASTOLIC BLOOD PRESSURE: 82 MMHG | HEIGHT: 75 IN | WEIGHT: 203.4 LBS | OXYGEN SATURATION: 89 % | HEART RATE: 97 BPM

## 2023-11-13 DIAGNOSIS — E11.9 TYPE 2 DIABETES MELLITUS WITHOUT COMPLICATION, WITHOUT LONG-TERM CURRENT USE OF INSULIN (HCC): Primary | ICD-10-CM

## 2023-11-13 DIAGNOSIS — E11.36: ICD-10-CM

## 2023-11-13 DIAGNOSIS — N18.32 STAGE 3B CHRONIC KIDNEY DISEASE (HCC): ICD-10-CM

## 2023-11-13 DIAGNOSIS — E78.2 MIXED HYPERLIPIDEMIA: ICD-10-CM

## 2023-11-13 DIAGNOSIS — I10 ESSENTIAL HYPERTENSION: ICD-10-CM

## 2023-11-13 PROCEDURE — 99213 OFFICE O/P EST LOW 20 MIN: CPT | Performed by: FAMILY MEDICINE

## 2023-11-19 PROBLEM — B35.1 ONYCHOMYCOSIS: Status: ACTIVE | Noted: 2023-08-02

## 2023-11-19 NOTE — PROGRESS NOTES
Assessment/Plan: Blood pressures able. Renal status stable. Continue with nephrology. A1c is trending down from 7.7 down to 7.5 down to 7.2. Because I am retiring I wanted him to see endocrinology as he is very limited on what medications he can take for his diabetes. Thankfully his numbers coming down but if it were to start trending up they can take over his care from his new primary care doctor. Not sure pending insurance would be to start a GLP due to renal benefit. Recommend recheck A1c in 3 to 6 months with new primary. Vaccines discussed. Liver function stable. Total cholesterol 153 with an LDL of 72. Continue statin. Always has elevated triglycerides. Continue proper dietary modification. Recommend yearly diabetic eye and foot examination. He has been a pleasure to take care of for many years. Wish him good health and happiness with his new primary care doctor which he will locate through [facility] since his other specialist are there. No problem-specific Assessment & Plan notes found for this encounter. Diagnoses and all orders for this visit:    Type 2 diabetes mellitus without complication, without long-term current use of insulin (HCC)    Stage 3b chronic kidney disease ([address])    Mixed hyperlipidemia    Diabetic cataract of both eyes ([address])    Essential hypertension        1. Type 2 diabetes mellitus without complication, without long-term current use of insulin ([facility])        2. Stage 3b chronic kidney disease ([address])        3. Mixed hyperlipidemia        4. Diabetic cataract of both eyes ([address])        5. Essential hypertension            Subjective:        Patient ID: Lili Rodriguez is a 60 y.o. male. Chief Complaint   Patient presents with    Annual Exam     Pt states he had a foot exam at his endo Dr chou in Oct.        Pressure and lab recheck. No new complaint.         The following portions of the patient's history were reviewed and updated as appropriate: past medical history, past surgical history and problem list.      Review of Systems   Constitutional:  Negative for fatigue. Eyes:  Negative for visual disturbance. Respiratory:  Negative for cough and shortness of breath. Cardiovascular:  Negative for chest pain, palpitations and leg swelling. Gastrointestinal:  Negative for abdominal pain. Neurological:  Negative for dizziness and headaches. Psychiatric/Behavioral:  The patient is not nervous/anxious. Objective:  /82   Pulse 97   Ht 6' 2.5" (1.892 m)   Wt 92.3 kg (203 lb 6.4 oz)   SpO2 (!) 89%   BMI 25.77 kg/m²        Physical Exam  Vitals and nursing note reviewed. Constitutional:       General: He is not in acute distress. Appearance: Normal appearance. He is not ill-appearing. HENT:      Head: Normocephalic. Eyes:      General: No scleral icterus. Pupils: Pupils are equal, round, and reactive to light. Cardiovascular:      Rate and Rhythm: Normal rate and regular rhythm. Heart sounds: Normal heart sounds. No murmur heard. Pulmonary:      Breath sounds: Normal breath sounds. Abdominal:      General: There is no distension. Musculoskeletal:      Right lower leg: No edema. Left lower leg: No edema. Lymphadenopathy:      Cervical: No cervical adenopathy. Skin:     Coloration: Skin is not pale. Neurological:      General: No focal deficit present. Mental Status: He is alert and oriented to person, place, and time. Mental status is at baseline. Psychiatric:         Mood and Affect: Mood normal.         Behavior: Behavior normal.         Thought Content:  Thought content normal.

## 2023-11-26 ENCOUNTER — VBI (OUTPATIENT)
Dept: ADMINISTRATIVE | Facility: OTHER | Age: 58
End: 2023-11-26

## 2023-11-29 ENCOUNTER — VBI (OUTPATIENT)
Dept: ADMINISTRATIVE | Facility: OTHER | Age: 58
End: 2023-11-29

## 2023-12-08 DIAGNOSIS — E78.00 PURE HYPERCHOLESTEROLEMIA: ICD-10-CM

## 2023-12-08 RX ORDER — ATORVASTATIN CALCIUM 20 MG/1
TABLET, FILM COATED ORAL
Qty: 90 TABLET | Refills: 1 | Status: SHIPPED | OUTPATIENT
Start: 2023-12-08

## 2024-08-16 LAB
LEFT EYE DIABETIC RETINOPATHY: NORMAL
RIGHT EYE DIABETIC RETINOPATHY: NORMAL